# Patient Record
Sex: MALE | Race: WHITE | Employment: FULL TIME | URBAN - METROPOLITAN AREA
[De-identification: names, ages, dates, MRNs, and addresses within clinical notes are randomized per-mention and may not be internally consistent; named-entity substitution may affect disease eponyms.]

---

## 2017-09-13 ENCOUNTER — HOSPITAL ENCOUNTER (OUTPATIENT)
Dept: PHYSICAL THERAPY | Age: 37
Discharge: HOME | End: 2017-09-13
Payer: COMMERCIAL

## 2017-09-13 PROCEDURE — 97162 PT EVAL MOD COMPLEX 30 MIN: CPT

## 2017-09-13 PROCEDURE — 97110 THERAPEUTIC EXERCISES: CPT
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
## 2017-09-19 ENCOUNTER — HOSPITAL ENCOUNTER (OUTPATIENT)
Dept: PHYSICAL THERAPY | Age: 37
Discharge: HOME | End: 2017-09-19
Payer: COMMERCIAL

## 2017-09-19 PROCEDURE — 97110 THERAPEUTIC EXERCISES: CPT

## 2017-09-19 NOTE — PROGRESS NOTES
PHYSICAL THERAPY - DAILY TREATMENT NOTE    Patient Name: Mary Jane [place]        Date: 2017  : 1980   YES Patient  Verified  Visit #:   2     Insurance: Payor: [employer] / Plan: [address] / Product Type: [employer] /      In time: 4:30 Out time: 5:00   Total Treatment Time: 30     TREATMENT AREA = Lower back pain [M54.5]  Sciatica [M54.30]    SUBJECTIVE    Pain Level (on 0 to 10 scale):  6  / 10   Medication Changes/New allergies or changes in medical history, any new surgeries or procedures? NO    If yes, update Summary List   Subjective Functional Status/Changes:  []  No changes reported     \"Knees feel weak, tingling all up and down my thigh. The lower leg feels like it's been asleep all day, feeling like it's waking up. Top of the foot  Is a little numb. Haven't noticed anything on the L today. \"          OBJECTIVE    30 min Therapeutic Exercise:  [x]  See flow sheet   Rationale:      increase ROM, increase strength/stability, facilitate proper motor control. Throughout Rx min Patient Education:  YES   Reviewed HEP   []  Progressed/Changed HEP based on:   Display of proper form in clinic. Improvement in condition and complaints     Other Objective/Functional Measures:    Patient educated on purpose of centralization, neurophysiological effects, and biomechanical effects in relation to his condition. Patient educated on use of HEP as a method to further centralize and reduce symptoms independently outside of clinic and therapist intervention. He verbalized understanding that central symptoms are an improvement over peripheral symptoms. Patient has difficulty in attaining proper motor control and movement for therex due to compensatory movements.    Patient reports intent to have his PCP be established as his main referral   Post Treatment Pain Level (on 0 to 10) scale:    10     ASSESSMENT    Assessment/Changes in Function: Derangement directional preference not fully established     []  See Progress Note/Recertification   Patient will continue to benefit from skilled PT services to modify and progress therapeutic interventions, address functional mobility deficits, address ROM deficits, address strength deficits, analyze and address soft tissue restrictions, analyze and cue movement patterns and instruct in home and community integration  to attain remaining goals   Progress toward goals / Updated goals:    1st session since initial eval, no significant progress noted. PLAN    [x]  Upgrade activities as tolerated YES Continue plan of care   []  Discharge due to :    []  Other:      Therapist: Ryland Swan \"BJ\" Abrahan, DPT, Cert. MDT, Cert. DN, Cert.  SMT    Date: 9/19/2017 Time: 4:25 PM   Future Appointments  Date Time Provider Derick Tapia   9/19/2017 4:30 PM Daralene Baptise, [facility]   9/20/2017 4:30 PM [facility]   9/26/2017 5:00 PM Daralene Baptise, [facility]   9/28/2017 4:30 PM Daralene Baptise, [facility]   10/3/2017 4:30 PM Daralene Baptise, [facility]   10/5/2017 4:30 PM Daralene Baptise, [facility]   10/9/2017 4:30 PM Soto Claudia, [facility]   10/12/2017 4:30 PM Daralene Baptise, [facility]   10/16/2017 4:30 PM Soto Claudia, [facility]   10/19/2017 4:30 PM Daralene Baptise, [facility]   10/23/2017 4:30 PM Soto Claudia, [facility]

## 2017-09-20 ENCOUNTER — HOSPITAL ENCOUNTER (OUTPATIENT)
Dept: PHYSICAL THERAPY | Age: 37
Discharge: HOME | End: 2017-09-20
Payer: COMMERCIAL

## 2017-09-20 PROCEDURE — 97110 THERAPEUTIC EXERCISES: CPT

## 2017-09-20 PROCEDURE — 97014 ELECTRIC STIMULATION THERAPY: CPT

## 2017-09-20 PROCEDURE — 97530 THERAPEUTIC ACTIVITIES: CPT

## 2017-09-20 PROCEDURE — 97140 MANUAL THERAPY 1/> REGIONS: CPT

## 2017-09-20 NOTE — PROGRESS NOTES
PHYSICAL THERAPY - DAILY TREATMENT NOTE    Patient Name: Perla Clinton        Date: 2017  : 1980   yes Patient  Verified  Visit #:   3     Insurance: Payor: [id number] / Plan: Jony Locks / Product Type: Federal Funded Programs /      In time: 391 Out time: 530   Total Treatment Time: 55     TREATMENT AREA =  Lower back pain [M54.5]  Sciatica [M54.30]    SUBJECTIVE  Pain Level (on 0 to 10 scale):  6  / 10   Medication Changes/New allergies or changes in medical history, any new surgeries or procedures?    no  If yes, update Summary List   Subjective Functional Status/Changes:  []  No changes reported     Pt reports higher pain levels due to increase activity with significant other the night previous to appt  pt reports prone lying decreases L/s sxs  pt reports rides in car all day and rides in [place] terrain           OBJECTIVE  Modalities Rationale:     decrease inflammation and decrease pain to improve patient's ability to .perform ADLs/ bending/stooping/ lifting/prolong sitting, stding and amb/ stairs with ease   10 min [x] Estim, type/location: IFC to L/s with HOB elevated in prone with 2 pillows                                     []  att     [x]  unatt     []  w/US     [x]  w/ice    []  w/heat    min []  Mechanical Traction: type/lbs                   []  pro   []  sup   []  int   []  cont    []  before manual    []  after manual    min []  Ultrasound, settings/location:      min []  Iontophoresis w/ dexamethasone, location:                                               []  take home patch       []  in clinic    min []  Ice     []  Heat    location/position:     min []  Vasopneumatic Device, press/temp:     min []  Other:    [x] Skin assessment post-treatment (if applicable):    [x]  intact    []  redness- no adverse reaction     []redness - adverse reaction:        22 min Therapeutic Exercise:  [x]  See flow sheet   Rationale:      increase ROM and increase strength to improve the patients ability to perform ADLs/ bending/stooping/ lifting/prolong sitting, stding and amb/ stairs with ease        15 min Manual Therapy: STM/MFR to L/s darrell, and OP to L/s for ext mobs with HOB elevated and 2 pillow   Rationale:      decrease pain, increase ROM, increase tissue extensibility, decrease trigger points and increase postural awareness to improve patient's ability to perform ADLs/ bending/stooping/ lifting/prolong sitting, stding and amb/ stairs with ease       8 min Therapeutic Activity: postural/bed mobility training     Rationale:    increase ROM and increase strength to improve the patients ability to perform proper posture, bed mobility and transfers without p!           min Patient Education:  yes  Reviewed HEP   []  Progressed/Changed HEP based on:  Pt ed on importance and benefits of compliance with HEP, core strength/stability and proper posture; pt verbalized understanding     Other Objective/Functional Measures:    VCs + demo to perform proper technique for TE  AAKASH/HAILEY with HOB elevated-NE/NE  Rep RSG-P/B; centralized to L/s, NE with R LE  PTT in B L/s darrell       Post Treatment Pain Level (on 0 to 10) scale:   \"numb in the back\"  / 10     ASSESSMENT  Assessment/Changes in Function:     tingling in foot post manual, less numbness in R shin post rx  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 Progress toward goals / Updated goals:     · Short Term Goals: To be accomplished in  2  weeks:  1. Patient will demonstrate compliance with HEP. MET  2. Patient will demonstrate ability to centralize symptoms to level of L/S to facilitate independent management of symptoms. 3. Patient will report less than or equal to 6/10 pain at worst to allow increased activity tolerance. · Long Term Goals: To be accomplished in  4  weeks:  1. Patient will demonstrate independence with HEP. 2. Patient will report less than or equal to 4/10 pain at worst to allow increased activity tolerance. 3. Patient will score greater than or equal to 60/100, stage 4 (little difficulty performing usual work or household activities and hobbies), indicating increased function/decreased functional limitation.      PLAN  []  Upgrade activities as tolerated yes Continue plan of care   []  Discharge due to :    []  Other:      Therapist: Liliana Steinberg PTA    Date: 9/20/2017 Time: 3:11 PM     Future Appointments  Date Time Provider Derick Tapia   9/20/2017 4:30 PM [facility]   9/26/2017 5:00 PM Marino Sánchez, [facility]   9/28/2017 4:30 PM Marino Sánchez, [facility]   10/4/2017 4:00 PM [facility]   10/5/2017 4:30 PM Marino Sánchez, [facility]   10/9/2017 4:30 PM Destiny Vazquez, [facility]   10/12/2017 4:30 PM Marino Sánchez, [facility]   10/16/2017 4:30 PM Destiny Vazquez, [facility]   10/19/2017 4:30 PM Marino Sánchez, [facility]   10/23/2017 4:30 PM Destiny Vazquez, [facility]

## 2017-09-26 ENCOUNTER — HOSPITAL ENCOUNTER (OUTPATIENT)
Dept: PHYSICAL THERAPY | Age: 37
Discharge: HOME | End: 2017-09-26
Payer: COMMERCIAL

## 2017-09-26 PROCEDURE — 97140 MANUAL THERAPY 1/> REGIONS: CPT

## 2017-09-26 PROCEDURE — 97014 ELECTRIC STIMULATION THERAPY: CPT

## 2017-09-26 PROCEDURE — 97110 THERAPEUTIC EXERCISES: CPT

## 2017-09-26 NOTE — PROGRESS NOTES
PHYSICAL THERAPY - DAILY TREATMENT NOTE    Patient Name: Aranza Thayer        Date: 2017  : 1980   YES Patient  Verified  Visit #:   4     Insurance: Payor: [employer] / Plan: Isabel Srivastava / Product Type: Federal Funded Programs /      In time: 5:00 Out time: 5:55   Total Treatment Time: 55     TREATMENT AREA = Lower back pain [M54.5]  Sciatica [M54.30]    SUBJECTIVE    Pain Level (on 0 to 10 scale):  5  / 10   Medication Changes/New allergies or changes in medical history, any new surgeries or procedures? NO    If yes, update Summary List   Subjective Functional Status/Changes:  []  No changes reported     \"Top of foot and calf are still asleep. \"          OBJECTIVE    15 min Therapeutic Exercise:  [x]  See flow sheet   Rationale:      increase ROM, increase strength/stability, facilitate proper motor control. 25 min Manual Therapy: REIL with OP;  Extension mobilization in prone   Rationale:      decrease pain, increase ROM, increase tissue extensibility, decrease trigger points and facilitate proper motor control   Modalities Rationale: Prophylaxis/Palliative   15 min [] Estim, type/location: IFC, prone, L/S                                    []  att     []  unatt     []  w/US     []  w/ice    []  W/heat    []  before manual    []  after manual    min []  Mechanical Traction: type/lbs                   []  pro   []  sup   []  int   []  cont    []  before manual    []  after manual    min []  Ultrasound, settings/location:      min []  Iontophoresis w/ dexamethasone, location:                                               []  take home patch       []  in clinic    min []  Ice     []  Heat    Position/location:     min []  Vasopneumatic Device, press/temp:    [] Skin assessment post-treatment (if applicable):    []  intact    []  redness- no adverse reaction     []redness - adverse reaction:      Throughout Rx min Patient Education:  YES   Reviewed HEP []  Progressed/Changed HEP based on:   Display of proper form in clinic. Improvement in condition and complaints     Other Objective/Functional Measures:    TA draw and REIL creates sensation to L L/S during, NBNW  Manual changes foot/calf numbness to tingling during, but NBNW afterwards           Post Treatment Pain Level (on 0 to 10) scale:   5  / 10     ASSESSMENT    Assessment/Changes in Function:     Unable to differentiate whether or not derangement is reducible at this time. []  See Progress Note/Recertification   Patient will continue to benefit from skilled PT services to modify and progress therapeutic interventions, address functional mobility deficits, address ROM deficits, address strength deficits, analyze and address soft tissue restrictions, analyze and cue movement patterns, analyze and modify body mechanics/ergonomics and instruct in home and community integration  to attain remaining goals   Progress toward goals / Updated goals:    No significant progress made in centralization of symptoms. Maintaining HEP compliance     PLAN    [x]  Upgrade activities as tolerated YES Continue plan of care   []  Discharge due to :    []  Other:      Therapist: Fadumo Walker \"SHAYNE\" SHAN Gauthier, Cert. MDT, Cert. DN, Cert.  SMT    Date: 9/26/2017 Time: 5:00 PM   Future Appointments  Date Time Provider Derick Tapai   9/28/2017 4:30 PM Emry Steve, [facility]   10/4/2017 4:00 PM Radha [facility]   10/5/2017 4:30 PM Emry Steve, PT [facility]   10/9/2017 4:30 PM Femi Gambino, PT [facility]   10/12/2017 4:30 PM Emry Steve, PT [facility]   10/16/2017 4:30 PM Femi Goad, PT [facility]   10/19/2017 4:30 PM Emry Steve, PT [facility]   10/23/2017 4:30 PM Femi Goad, [facility]

## 2017-09-28 ENCOUNTER — HOSPITAL ENCOUNTER (OUTPATIENT)
Dept: PHYSICAL THERAPY | Age: 37
Discharge: HOME | End: 2017-09-28
Payer: COMMERCIAL

## 2017-09-28 PROCEDURE — 97110 THERAPEUTIC EXERCISES: CPT

## 2017-09-28 PROCEDURE — 97014 ELECTRIC STIMULATION THERAPY: CPT

## 2017-09-28 PROCEDURE — 97140 MANUAL THERAPY 1/> REGIONS: CPT

## 2017-09-28 NOTE — PROGRESS NOTES
PHYSICAL THERAPY - DAILY TREATMENT NOTE    Patient Name: Ruben Worrell        Date: 2017  : 1980   YES Patient  Verified  Visit #:   5     Insurance: Payor: [id number] / Plan: Misty Tariq / Product Type: [employer] /      In time: 4:15early Out time: 5:15   Total Treatment Time: 60     TREATMENT AREA = Lower back pain [M54.5]  Sciatica [M54.30]    SUBJECTIVE    Pain Level (on 0 to 10 scale):  6  / 10   Medication Changes/New allergies or changes in medical history, any new surgeries or procedures? NO    If yes, update Summary List   Subjective Functional Status/Changes:  []  No changes reported     \"I can feel the bottom of my foot fine, but everything else is numb. Feels like the top of my foot is wax. I still haven't heard anything from my doctor. \"          OBJECTIVE    30 min Therapeutic Exercise:  [x]  See flow sheet   Rationale:      increase ROM, increase strength/stability, facilitate proper motor control. 15 min Manual Therapy: REIL with OP;  Extension mobilization in prone  Extension moblization with HOC L  Extension mobilization in [place] position   Rationale:      decrease pain, increase ROM, increase tissue extensibility, decrease trigger points and facilitate proper motor control   Modalities Rationale: Prophylaxis/Palliative   15 min [x] Estim, type/location: IFC, [place] position                                    []  att     [x]  unatt     []  w/US     []  w/ice    [x]  W/heat    []  before manual    []  after manual    min []  Mechanical Traction: type/lbs                   []  pro   []  sup   []  int   []  cont    []  before manual    []  after manual    min []  Ultrasound, settings/location:      min []  Iontophoresis w/ dexamethasone, location:                                               []  take home patch       []  in clinic    min []  Ice     []  Heat    Position/location:     min []  Vasopneumatic Device, press/temp:    [] Skin assessment post-treatment (if applicable):    []  intact    []  redness- no adverse reaction     []redness - adverse reaction:      Throughout Rx min Patient Education:  YES   Reviewed HEP   []  Progressed/Changed HEP based on:   Display of proper form in clinic. Improvement in condition and complaints     Other Objective/Functional Measures:    Trial of [place] position \"feels good\" but no significant change in reduction of numbness. Post Treatment Pain Level (on 0 to 10) scale:   0  / 10     ASSESSMENT    Assessment/Changes in Function:     Lateral component     []  See Progress Note/Recertification   Patient will continue to benefit from skilled PT services to modify and progress therapeutic interventions, address functional mobility deficits, address ROM deficits, address strength deficits, analyze and address soft tissue restrictions, analyze and cue movement patterns, analyze and modify body mechanics/ergonomics and instruct in home and community integration  to attain remaining goals   Progress toward goals / Updated goals:    Slow progress in centralization     PLAN    [x]  Upgrade activities as tolerated YES Continue plan of care   []  Discharge due to :    []  Other:      Therapist: Marli Trujillo \"BJ\" Arturo Conteh, SHAN, Cert. MDT, Cert. DN, Cert.  SMT    Date: 9/28/2017 Time: 4:39 PM   Future Appointments  Date Time Provider Derick Tapia   10/4/2017 4:00 PM Suha [facility]   10/5/2017 4:30 PM [facility], [facility]   10/9/2017 4:30 PM Tien Blum PT [facility]   10/12/2017 4:30 PM [facility], [facility]   10/16/2017 4:30 PM Tien Blum PT [facility]   10/19/2017 4:30 PM [facility], [facility]   10/23/2017 4:30 PM Tien Blum, [facility]

## 2017-10-03 ENCOUNTER — APPOINTMENT (OUTPATIENT)
Dept: PHYSICAL THERAPY | Age: 37
End: 2017-10-03
Payer: OTHER GOVERNMENT

## 2017-10-04 ENCOUNTER — HOSPITAL ENCOUNTER (OUTPATIENT)
Dept: PHYSICAL THERAPY | Age: 37
Discharge: HOME | End: 2017-10-04
Payer: OTHER GOVERNMENT

## 2017-10-04 PROCEDURE — 97140 MANUAL THERAPY 1/> REGIONS: CPT

## 2017-10-04 PROCEDURE — 97110 THERAPEUTIC EXERCISES: CPT

## 2017-10-04 PROCEDURE — 97014 ELECTRIC STIMULATION THERAPY: CPT

## 2017-10-04 NOTE — PROGRESS NOTES
PHYSICAL THERAPY - DAILY TREATMENT NOTE    Patient Name: Ruben Worrell        Date: 10/4/2017  : 1980   yes Patient  Verified  Visit #:   6     Insurance: Payor: [id number] / Plan: Misty Tariq / Product Type: [employer] /      In time: 400 Out time: 500   Total Treatment Time: 60     TREATMENT AREA =  Lower back pain [M54.5]  Sciatica [M54.30]    SUBJECTIVE  Pain Level (on 0 to 10 scale):  2-3  / 10   Medication Changes/New allergies or changes in medical history, any new surgeries or procedures?    no  If yes, update Summary List   Subjective Functional Status/Changes:  []  No changes reported     Pt reports overall improvement in mobility and in amb        OBJECTIVE  Modalities Rationale:     decrease inflammation and decrease pain to improve patient's ability to .perform ADLs/ bending/stooping/ lifting/prolong sitting, stding and amb/ stairs with ease   15 min [x] Estim, type/location: IFC to L/s with HOB elevated in prone                                     []  att     [x]  unatt     []  w/US     [x]  w/ice    []  w/heat    min []  Mechanical Traction: type/lbs                   []  pro   []  sup   []  int   []  cont    []  before manual    []  after manual    min []  Ultrasound, settings/location:      min []  Iontophoresis w/ dexamethasone, location:                                               []  take home patch       []  in clinic    min []  Ice     []  Heat    location/position:     min []  Vasopneumatic Device, press/temp:     min []  Other:    [x] Skin assessment post-treatment (if applicable):    [x]  intact    []  redness- no adverse reaction     []redness - adverse reaction:        30 min Therapeutic Exercise:  [x]  See flow sheet   Rationale:      increase ROM and increase strength to improve the patients ability to perform ADLs/ bending/stooping/ lifting/prolong sitting, stding and amb/ stairs with ease        15 min Manual Therapy: IASTM with large cup edge to L/s darrell, and OP to L/s HOC L for ext mobs*note  HOB elevated    Rationale:      decrease pain, increase ROM, increase tissue extensibility, decrease trigger points and increase postural awareness to improve patient's ability to perform ADLs/ bending/stooping/ lifting/prolong sitting, stding and amb/ stairs with ease        min Patient Education:  yes  Reviewed HEP   []  Progressed/Changed HEP based on:  Pt ed on importance and benefits of compliance with HEP, core strength/stability and proper posture; pt verbalized understanding     Other Objective/Functional Measures:    VCs + demo to perform proper technique for TE  demos log roll (I) without increased p!  c/o L L/s spasm with HL hip abd, decreased with manual     initiated prone TKE/quad str without c/o p! Post Treatment Pain Level (on 0 to 10) scale:   \"numb\"  / 10     ASSESSMENT  Assessment/Changes in Function:     Progressed there-ex without c/o increase p!  demos improved posture without VCs     []  See Progress Note/Recertification   Patient will continue to benefit from skilled PT services to modify and progress therapeutic interventions, address functional mobility deficits, address ROM deficits, address strength deficits, analyze and address soft tissue restrictions, analyze and cue movement patterns, analyze and modify body mechanics/ergonomics, assess and modify postural abnormalities and instruct in home and community integration to attain remaining goals. Progress toward goals / Updated goals: · Short Term Goals: To be accomplished in  2  weeks:  1. Patient will demonstrate compliance with HEP. MET  2. Patient will demonstrate ability to centralize symptoms to level of L/S to facilitate independent management of symptoms. 3. Patient will report less than or equal to 6/10 pain at worst to allow increased activity tolerance. max p! reported since 9/26/17 is 6/10  · Long Term Goals:  To be accomplished in 4  weeks:  1. Patient will demonstrate independence with HEP. 2. Patient will report less than or equal to 4/10 pain at worst to allow increased activity tolerance. 3. Patient will score greater than or equal to 60/100, stage 4 (little difficulty performing usual work or household activities and hobbies), indicating increased function/decreased functional limitation.      PLAN  []  Upgrade activities as tolerated yes Continue plan of care   []  Discharge due to :    []  Other:      Therapist: Nilson Linares PTA    Date: 10/4/2017 Time: 3:11 PM     Future Appointments  Date Time Provider Derick Tapia   10/5/2017 4:30 PM Delanna Amy, [address] [place] [address]   10/9/2017 4:30 PM Cindy Garcia [facility]   10/12/2017 4:30 PM Delanna Landing, [address] [place] [address]   10/16/2017 4:30 PM Cindy Garcia PT [facility]   10/19/2017 4:30 PM Darien Reyes PT [facility]   10/23/2017 4:30 PM Cindy Garcia PT [facility]

## 2017-10-05 ENCOUNTER — HOSPITAL ENCOUNTER (OUTPATIENT)
Dept: PHYSICAL THERAPY | Age: 37
Discharge: HOME | End: 2017-10-05
Payer: OTHER GOVERNMENT

## 2017-10-05 PROCEDURE — 97110 THERAPEUTIC EXERCISES: CPT

## 2017-10-05 PROCEDURE — 97140 MANUAL THERAPY 1/> REGIONS: CPT

## 2017-10-05 NOTE — PROGRESS NOTES
PHYSICAL THERAPY - DAILY TREATMENT NOTE    Patient Name: Glenn Smith        Date: 10/5/2017  : 1980   YES Patient  Verified  Visit #:     Insurance: Payor: [id number] / Plan: Truong Deleon / Product Type: [employer] /      In time: 4:30 Out time: 5:20   Total Treatment Time: 50     TREATMENT AREA = Lower back pain [M54.5]  Sciatica [M54.30]    SUBJECTIVE    Pain Level (on 0 to 10 scale):  3  / 10   Medication Changes/New allergies or changes in medical history, any new surgeries or procedures? NO    If yes, update Summary List   Subjective Functional Status/Changes:  []  No changes reported     \"My back hasn't been hurting as much since the weekend. \"          OBJECTIVE    40 min Therapeutic Exercise:  [x]  See flow sheet   Rationale:      increase ROM, increase strength/stability, facilitate proper motor control. 10 min Manual Therapy: Flexion rotation mobilization to R (UE to L)   Rationale:      decrease pain, increase ROM, increase tissue extensibility, decrease trigger points and facilitate proper motor control       Throughout Rx min Patient Education:  YES   Reviewed HEP   []  Progressed/Changed HEP based on:   Display of proper form in clinic. Improvement in condition and complaints     Other Objective/Functional Measures:    Cavitation at end range of manual per above.   Required pillow under R hip to perform hook-lying therex  Educated pt on performing RSG R in free standing      Post Treatment Pain Level (on 0 to 10) scale:    10     ASSESSMENT    Assessment/Changes in Function:     Appears to have relevant R lateral component as Flexion Rotation Mobilization alleviated symptoms significantly     []  See Progress Note/Recertification   Patient will continue to benefit from skilled PT services to modify and progress therapeutic interventions, address functional mobility deficits, address strength deficits, analyze and address soft tissue restrictions, analyze and cue movement patterns, analyze and modify body mechanics/ergonomics and instruct in home and community integration  to attain remaining goals   Progress toward goals / Updated goals:    Pain appears to be reducing, though peripheral symptoms not improving significantly     PLAN    [x]  Upgrade activities as tolerated YES Continue plan of care   []  Discharge due to :    []  Other:      Therapist: Jennifer Lu \"BJ\" SHAN Gauthier, Cert. MDT, Cert. DN, Cert.  SMT    Date: 10/5/2017 Time: 5:19 PM   Future Appointments  Date Time Provider Derick Tapia   10/9/2017 4:30 PM Summer Islas, [facility]   10/12/2017 4:30 PM Louise , [facility]   10/16/2017 4:30 PM Summer Islas, [facility]   10/19/2017 4:30 PM Louise , [facility]   10/23/2017 4:30 PM Summer Islas, [facility]

## 2017-10-09 ENCOUNTER — HOSPITAL ENCOUNTER (OUTPATIENT)
Dept: PHYSICAL THERAPY | Age: 37
Discharge: HOME | End: 2017-10-09
Payer: OTHER GOVERNMENT

## 2017-10-09 PROCEDURE — 97014 ELECTRIC STIMULATION THERAPY: CPT

## 2017-10-09 PROCEDURE — 97110 THERAPEUTIC EXERCISES: CPT

## 2017-10-09 NOTE — PROGRESS NOTES
PHYSICAL THERAPY - DAILY TREATMENT NOTE    Patient Name: Bethany Garcia        Date: 10/9/2017  : 1980   YES Patient  Verified  Visit #:  [id number]     Insurance: Payor: [id number] / Plan: Miguel A Camara / Product Type: Federal Funded Programs /      In time: 4:30 Out time: 5:40   Total Treatment Time: 70     Medicare Time Tracking (below)   Total Timed Codes (min):  NA 1:1 Treatment Time:  NA     TREATMENT AREA =  L/S    SUBJECTIVE    Pain Level (on 0 to 10 scale):  8  / 10   Medication Changes/New allergies or changes in medical history, any new surgeries or procedures? NO    If yes, update Summary List   Subjective Functional Status/Changes:  []  No changes reported     \"I asked them to up my nerve pain but I don't have a primary so I don't know who to ask. Overall the pain is better but my legs haven't changed. I think pistoning and shifting sometimes.            OBJECTIVE    Modalities Rationale:  Decrease pain  15 min [x] Estim, type/location: Prone prop IFC to lower L/S                                     []  att     [x]  unatt     []  w/US     [x]  w/ice    []  w/heat    min []  Mechanical Traction: type/lbs                   []  pro   []  sup   []  int   []  cont    []  before manual    []  after manual    min []  Ultrasound, settings/location:      min []  Iontophoresis w/ dexamethasone, location:                                               []  take home patch       []  in clinic    min []  Ice     []  Heat    location/position:     min []  Vasopneumatic Device, press/temp:     min []  Other:    [] Skin assessment post-treatment (if applicable):    []  intact    []  redness- no adverse reaction     []redness - adverse reaction:      55  (40) min Therapeutic Exercise:  [x]  See flow sheet   Rationale:      increase ROM, increase strength and stability to improve the patients ability to perform ADLs with increased ease      min Patient Education:  Kole Fernandes Reviewed HEP   []  Progressed/Changed HEP based on:   Cont HEP     Other Objective/Functional Measures:Trialed L/S roll on NS, pt reports increased soreness from feeling pressure from roll   HAILEY decreased to 1/10  No increased pain with prone TA draw, reports increased vertical sensation of pain with added glute squeeze but not TKE      Post Treatment Pain Level (on 0 to 10) scale:   0  / 10 \"numb\"     ASSESSMENT    Assessment/Changes in Function:     Reassess NV for MD note   []  See Progress Note/Recertification   Patient will continue to benefit from skilled PT services to analyze,, cue,, progress,, modify,, demonstrate,, instruct, and address, movement patterns,, therapeutic interventions,, postural abnormalities,, soft tissue restrictions,, ROM,, strength,, functional mobility,, body mechanics/ergonomics, and home and community integration, to attain remaining goals.    Progress toward goals / Updated goals:    Reassess NV for MD note     PLAN    []  Upgrade activities as tolerated YES Continue plan of care   []  Discharge due to :    []  Other:      Therapist: Yogesh Lmi DPT    Date: 10/9/2017 Time: 5:09 PM   Future Appointments  Date Time Provider Derick Tapia   10/12/2017 4:30 PM Olayinka Varghese PT [facility]   10/16/2017 4:30 PM Jane Griffith PT [facility]   10/19/2017 4:30 PM Olayinka Varghese PT [facility]   10/23/2017 4:30 PM Jane Griffith PT [facility]

## 2017-10-12 ENCOUNTER — HOSPITAL ENCOUNTER (OUTPATIENT)
Dept: PHYSICAL THERAPY | Age: 37
Discharge: HOME | End: 2017-10-12
Payer: OTHER GOVERNMENT

## 2017-10-12 PROCEDURE — 97014 ELECTRIC STIMULATION THERAPY: CPT

## 2017-10-12 PROCEDURE — 97140 MANUAL THERAPY 1/> REGIONS: CPT

## 2017-10-12 PROCEDURE — 97110 THERAPEUTIC EXERCISES: CPT

## 2017-10-12 NOTE — PROGRESS NOTES
PHYSICAL THERAPY - DAILY TREATMENT NOTE    Patient Name: Glenn Smith        Date: 10/12/2017  : 1980   YES Patient  Verified  Visit #:     Insurance: Payor: [id number] / Plan: Truong Deleon / Product Type: [employer] /      In time: 4:30 Out time: 5:20   Total Treatment Time: 50     TREATMENT AREA = Lower back pain [M54.5]  Sciatica [M54.30]    SUBJECTIVE    Pain Level (on 0 to 10 scale):  6  / 10   Medication Changes/New allergies or changes in medical history, any new surgeries or procedures? NO    If yes, update Summary List   Subjective Functional Status/Changes:  []  No changes reported     \"past few days I've been feeling like my L hip is loose so I'm limping a lot more. I have more pain on the L side and my (L trunk) region is more tight. Also getting more numbness to the mid/upper back. The pain is more like a \"u\" shape and I feel like I've got a lump there. I can't think of anything I've done different the past few days. \"   \"They finally set me up with an appointment for me to f/u with a doctor on the . \"         OBJECTIVE    10 min Therapeutic Exercise:  [x]  See flow sheet   Rationale:      increase ROM, increase strength/stability, facilitate proper motor control. 25 min Manual Therapy: STM to L/S, re-assessment via MDT.    Rationale:      decrease pain, increase ROM, increase tissue extensibility, decrease trigger points and facilitate proper motor control   Modalities Rationale: Prophylaxis/Palliative   15 min [] Estim, type/location: Prone, L/S                                    []  att     []  unatt     []  w/US     []  w/ice    []  W/heat    []  before manual    []  after manual    min []  Mechanical Traction: type/lbs                   []  pro   []  sup   []  int   []  cont    []  before manual    []  after manual    min []  Ultrasound, settings/location:      min []  Iontophoresis w/ dexamethasone, location: []  take home patch       []  in clinic    min []  Ice     []  Heat    Position/location:     min []  Vasopneumatic Device, press/temp:    [] Skin assessment post-treatment (if applicable):    []  intact    []  redness- no adverse reaction     []redness - adverse reaction:      Throughout Rx min Patient Education:  YES   Reviewed HEP   []  Progressed/Changed HEP based on:   Display of proper form in clinic. Improvement in condition and complaints     Other Objective/Functional Measures:    See PN     Post Treatment Pain Level (on 0 to 10) scale:   6 / 10     ASSESSMENT    Assessment/Changes in Function:     See PN     []  See Progress Note/Recertification   Patient will continue to benefit from skilled PT services to n/a  to attain remaining goals   Progress toward goals / Updated goals:    See Note     PLAN    [x]  Upgrade activities as tolerated YES Continue plan of care   []  Discharge due to :    []  Other:      Therapist: Minerva Phelan \"BJ\" SHAN Gauthier, Cert. MDT, Cert. DN, Cert.  SMT    Date: 10/12/2017 Time: 4:42 PM   Future Appointments  Date Time Provider Derick Tapia   10/16/2017 4:30 PM Katty Chew PT [facility]   10/19/2017 4:30 PM Tawanna Girard PT [facility]   10/20/2017 3:00 PM Waqas Vargas NP [facility]   10/23/2017 4:30 PM Katty Chew PT [facility]

## 2017-10-12 NOTE — PROGRESS NOTES
[facility]ERAP[facility]  [address] [place] [address] Katherine Rasmussen, [address] - Phone: [phone]  Fax: [phone]  PROGRESS NOTE  Patient Name: Marie Schmitz : 1980   Treatment/Medical Diagnosis: Lower back pain [M54.5]  Sciatica [M54.30]   Referral Source: Kary Mccall MD     Date of Initial Visit: 17 Attended Visits: 9 Missed Visits: 0     SUMMARY OF TREATMENT  Patient's POC has consisted of therex, therapeutic activities, manual therapy prn, modalities prn, pt. education, and a comprehensive HEP. Treatment strategies used to address functional mobility deficits, ROM deficits, strength deficits, analyze and address soft tissue restrictions, analyze and cue movement patterns, analyze and modify body mechanics/ergonomics, assess and modify postural abnormalities and instruct in home and community integration. CURRENT STATUS  Patient has made minimal progress in maintaining centralization of RLE paraesthesia. He is able to abolish pain but numbness persists, and he has minimal carryover between treatment sessions. Lateral component noted over the past 3 sessions which improved pain overall, but upon today's treatment session he reported L sided symptoms of hip instability and radiating pain along L thoracolumbar spine. He attributes symptoms to compensation with loaded activities, but otherwise no change in activities since last treatment session on 10/9/17. Patient was re-assessed for directional preference and symptom change. No directional preference noted in ability to alleviate extremity symptoms though LBP decreased with extension. Patient denies any worsening of bowel/bladder control, no saddle paraesthesia, no loss in motor control when BLE was re-assessed. General deconditioning present but no tonic changes. Reflexes intact. Goal/Measure of Progress Goal Met? · Short Term Goals: To be accomplished in  2  weeks:  1.  Patient will demonstrate compliance with HEP. 2. Patient will demonstrate ability to centralize symptoms to level of L/S to facilitate independent management of symptoms. 3. Patient will report less than or equal to 6/10 pain at worst to allow increased activity tolerance. · Long Term Goals: To be accomplished in  4  weeks:  1. Patient will demonstrate independence with HEP. 2. Patient will report less than or equal to 4/10 pain at worst to allow increased activity tolerance. 3. Patient will score greater than or equal to 60/100, stage 4 (little difficulty performing usual work or household activities and hobbies), indicating increased function/decreased functional limitation. MET    NO        Partially Met          Ongoing    Not Met      Not met   RECOMMENDATIONS  Pt to be placed on hold until f/u with MD. Defer re: continuation of care or DC at this time due to changes in symptoms. Before change in symptoms, derangement did not appear to be reducible. If you have any questions/comments please contact us directly at [phone]. Thank you for allowing us to assist in the care of your patient. Therapist Signature: Ace Brule \"BJ\" Delmi Olivares DPT, Cert. MDT, Cert. DN, Cert. SMT Date: 84/56/9758   Certification Period: n/a     Reporting Period n/a   Time: 5:22 PM   NOTE TO PHYSICIAN:  PLEASE COMPLETE THE ORDERS BELOW AND FAX TO   [facility]: [phone]. If you are unable to process this request in 24 hours please contact our office: [phone].    ___ I have read the above report and request that my patient continue as recommended.   ___ I have read the above report and request that my patient continue therapy with the following changes/special instructions:_________________________________________________________   ___ I have read the above report and request that my patient be discharged from therapy.      Physician Signature:        Date:       Time:

## 2017-10-16 ENCOUNTER — APPOINTMENT (OUTPATIENT)
Dept: PHYSICAL THERAPY | Age: 37
End: 2017-10-16
Payer: OTHER GOVERNMENT

## 2017-10-16 ENCOUNTER — HOSPITAL ENCOUNTER (EMERGENCY)
Age: 37
Discharge: HOME | End: 2017-10-16
Attending: EMERGENCY MEDICINE
Payer: OTHER GOVERNMENT

## 2017-10-16 VITALS
WEIGHT: 230 LBS | SYSTOLIC BLOOD PRESSURE: 133 MMHG | BODY MASS INDEX: 36.96 KG/M2 | HEART RATE: 100 BPM | RESPIRATION RATE: 16 BRPM | OXYGEN SATURATION: 97 % | TEMPERATURE: 98.4 F | DIASTOLIC BLOOD PRESSURE: 94 MMHG | HEIGHT: 66 IN

## 2017-10-16 DIAGNOSIS — M54.32 SCIATICA OF LEFT SIDE: Primary | ICD-10-CM

## 2017-10-16 PROCEDURE — 99282 EMERGENCY DEPT VISIT SF MDM: CPT

## 2017-10-16 RX ORDER — NAPROXEN 500 MG/1
500 TABLET ORAL 2 TIMES DAILY WITH MEALS
COMMUNITY
End: 2017-10-19 | Stop reason: ALTCHOICE

## 2017-10-16 RX ORDER — GABAPENTIN 300 MG/1
300 CAPSULE ORAL 3 TIMES DAILY
COMMUNITY
End: 2017-11-21

## 2017-10-16 RX ORDER — PREDNISONE 50 MG/1
50 TABLET ORAL DAILY
Qty: 5 TAB | Refills: 0 | Status: SHIPPED | OUTPATIENT
Start: 2017-10-16 | End: 2017-10-21

## 2017-10-16 RX ORDER — OMEPRAZOLE 20 MG/1
20 CAPSULE, DELAYED RELEASE ORAL DAILY
COMMUNITY
End: 2017-11-21 | Stop reason: ALTCHOICE

## 2017-10-16 RX ORDER — HYDROCODONE BITARTRATE AND ACETAMINOPHEN 5; 325 MG/1; MG/1
TABLET ORAL
Qty: 12 TAB | Refills: 0 | Status: SHIPPED | OUTPATIENT
Start: 2017-10-16 | End: 2018-01-18 | Stop reason: ALTCHOICE

## 2017-10-16 NOTE — ED NOTES
Patient states he has been doubling up on gabapentin this week due to the pain, states he cnnot get into the [address]
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
## 2017-10-16 NOTE — ED TRIAGE NOTES
Lower L back pain and pain in L hip, onset one week, denies recent injury/trauma to back, states \"it feels like there is a mass in my L hip that is pushing on my hip and causing it to feel out of place\"    Abdominal \"swelling\" and pain, aome nausea \"like sea sickness\"

## 2017-10-16 NOTE — DISCHARGE INSTRUCTIONS
Sciatica: Exercises  Your Care Instructions  Here are some examples of typical rehabilitation exercises for your condition. Start each exercise slowly. Ease off the exercise if you start to have pain. Your doctor or physical therapist will tell you when you can start these exercises and which ones will work best for you. When you are not being active, find a comfortable position for rest. Some people are comfortable on the floor or a medium-firm bed with a small pillow under their head and another under their knees. Some people prefer to lie on their side with a pillow between their knees. Don't stay in one position for too long. Take short walks (10 to 20 minutes) every 2 to 3 hours. Avoid slopes, hills, and stairs until you feel better. Walk only distances you can manage without pain, especially leg pain. How to do the exercises  Back stretches    1. Get down on your hands and knees on the floor. 2. Relax your head and allow it to droop. Round your back up toward the ceiling until you feel a nice stretch in your upper, middle, and lower back. Hold this stretch for as long as it feels comfortable, or about 15 to 30 seconds. 3. Return to the starting position with a flat back while you are on your hands and knees. 4. Let your back sway by pressing your stomach toward the floor. Lift your buttocks toward the ceiling. 5. Hold this position for 15 to 30 seconds. 6. Repeat 2 to 4 times. Follow-up care is a key part of your treatment and safety. Be sure to make and go to all appointments, and call your doctor if you are having problems. It's also a good idea to know your test results and keep a list of the medicines you take. Where can you learn more? Go to http://anoop-adriana.info/. Enter U614 in the search box to learn more about \"Sciatica: Exercises. \"  Current as of: March 21, 2017  Content Version: 11.3  © 3570-0186 [employer].  Care instructions adapted under license by [address] (which disclaims liability or warranty for this information). If you have questions about a medical condition or this instruction, always ask your healthcare professional. [address] any warranty or liability for your use of this information.

## 2017-10-19 ENCOUNTER — HOSPITAL ENCOUNTER (OUTPATIENT)
Dept: LAB | Age: 37
Discharge: HOME | End: 2017-10-19
Payer: COMMERCIAL

## 2017-10-19 ENCOUNTER — OFFICE VISIT (OUTPATIENT)
Dept: FAMILY MEDICINE CLINIC | Facility: CLINIC | Age: 37
End: 2017-10-19

## 2017-10-19 ENCOUNTER — APPOINTMENT (OUTPATIENT)
Dept: PHYSICAL THERAPY | Age: 37
End: 2017-10-19
Payer: OTHER GOVERNMENT

## 2017-10-19 VITALS
HEART RATE: 105 BPM | RESPIRATION RATE: 17 BRPM | WEIGHT: 250 LBS | OXYGEN SATURATION: 93 % | BODY MASS INDEX: 40.18 KG/M2 | HEIGHT: 66 IN | TEMPERATURE: 98.1 F | SYSTOLIC BLOOD PRESSURE: 142 MMHG | DIASTOLIC BLOOD PRESSURE: 84 MMHG

## 2017-10-19 DIAGNOSIS — Z00.00 ENCOUNTER FOR MEDICAL EXAMINATION TO ESTABLISH CARE: ICD-10-CM

## 2017-10-19 DIAGNOSIS — M54.32 SCIATICA OF LEFT SIDE: ICD-10-CM

## 2017-10-19 DIAGNOSIS — Z23 ENCOUNTER FOR IMMUNIZATION: Primary | ICD-10-CM

## 2017-10-19 LAB
25(OH)D3 SERPL-MCNC: 19.2 NG/ML (ref 30–100)
ALBUMIN SERPL-MCNC: 3.8 G/DL (ref 3.4–5)
ALBUMIN/GLOB SERPL: 1.4 {RATIO} (ref 0.8–1.7)
ALP SERPL-CCNC: 89 U/L (ref 45–117)
ALT SERPL-CCNC: 64 U/L (ref 16–61)
ANION GAP SERPL CALC-SCNC: 6 MMOL/L (ref 3–18)
APPEARANCE UR: CLEAR
AST SERPL-CCNC: 25 U/L (ref 15–37)
BACTERIA URNS QL MICRO: NEGATIVE /HPF
BASOPHILS # BLD: 0 K/UL (ref 0–0.06)
BASOPHILS NFR BLD: 0 % (ref 0–2)
BILIRUB SERPL-MCNC: 0.4 MG/DL (ref 0.2–1)
BILIRUB UR QL: NEGATIVE
BUN SERPL-MCNC: 24 MG/DL (ref 7–18)
BUN/CREAT SERPL: 20 (ref 12–20)
CALCIUM SERPL-MCNC: 8.7 MG/DL (ref 8.5–10.1)
CAOX CRY URNS QL MICRO: ABNORMAL
CHLORIDE SERPL-SCNC: 108 MMOL/L (ref 100–108)
CHOLEST SERPL-MCNC: 216 MG/DL
CO2 SERPL-SCNC: 28 MMOL/L (ref 21–32)
COLOR UR: ABNORMAL
CREAT SERPL-MCNC: 1.2 MG/DL (ref 0.6–1.3)
DIFFERENTIAL METHOD BLD: ABNORMAL
EOSINOPHIL # BLD: 0.1 K/UL (ref 0–0.4)
EOSINOPHIL NFR BLD: 1 % (ref 0–5)
EPITH CASTS URNS QL MICRO: ABNORMAL /LPF (ref 0–5)
ERYTHROCYTE [DISTWIDTH] IN BLOOD BY AUTOMATED COUNT: 13.2 % (ref 11.6–14.5)
EST. AVERAGE GLUCOSE BLD GHB EST-MCNC: 111 MG/DL
GLOBULIN SER CALC-MCNC: 2.7 G/DL (ref 2–4)
GLUCOSE SERPL-MCNC: 90 MG/DL (ref 74–99)
GLUCOSE UR STRIP.AUTO-MCNC: NEGATIVE MG/DL
HBA1C MFR BLD: 5.5 % (ref 4.2–5.6)
HCT VFR BLD AUTO: 43.4 % (ref 36–48)
HDLC SERPL-MCNC: 37 MG/DL (ref 40–60)
HDLC SERPL: 5.8 {RATIO} (ref 0–5)
HGB BLD-MCNC: 14.6 G/DL (ref 13–16)
HGB UR QL STRIP: NEGATIVE
KETONES UR QL STRIP.AUTO: NEGATIVE MG/DL
LDLC SERPL CALC-MCNC: 111 MG/DL (ref 0–100)
LEUKOCYTE ESTERASE UR QL STRIP.AUTO: NEGATIVE
LIPID PROFILE,FLP: ABNORMAL
LYMPHOCYTES # BLD: 4.3 K/UL (ref 0.9–3.6)
LYMPHOCYTES NFR BLD: 40 % (ref 21–52)
MCH RBC QN AUTO: 30.4 PG (ref 24–34)
MCHC RBC AUTO-ENTMCNC: 33.6 G/DL (ref 31–37)
MCV RBC AUTO: 90.4 FL (ref 74–97)
MONOCYTES # BLD: 0.8 K/UL (ref 0.05–1.2)
MONOCYTES NFR BLD: 7 % (ref 3–10)
NEUTS SEG # BLD: 5.6 K/UL (ref 1.8–8)
NEUTS SEG NFR BLD: 52 % (ref 40–73)
NITRITE UR QL STRIP.AUTO: NEGATIVE
PH UR STRIP: 5.5 [PH] (ref 5–8)
PLATELET # BLD AUTO: 239 K/UL (ref 135–420)
PMV BLD AUTO: 10 FL (ref 9.2–11.8)
POTASSIUM SERPL-SCNC: 4 MMOL/L (ref 3.5–5.5)
PROT SERPL-MCNC: 6.5 G/DL (ref 6.4–8.2)
PROT UR STRIP-MCNC: NEGATIVE MG/DL
RBC # BLD AUTO: 4.8 M/UL (ref 4.7–5.5)
RBC #/AREA URNS HPF: 0 /HPF (ref 0–5)
SODIUM SERPL-SCNC: 142 MMOL/L (ref 136–145)
SP GR UR REFRACTOMETRY: >1.03 (ref 1–1.03)
T4 FREE SERPL-MCNC: 1.1 NG/DL (ref 0.7–1.5)
TRIGL SERPL-MCNC: 340 MG/DL (ref ?–150)
TSH SERPL DL<=0.05 MIU/L-ACNC: 3.07 UIU/ML (ref 0.36–3.74)
UROBILINOGEN UR QL STRIP.AUTO: 1 EU/DL (ref 0.2–1)
VLDLC SERPL CALC-MCNC: 68 MG/DL
WBC # BLD AUTO: 10.7 K/UL (ref 4.6–13.2)
WBC URNS QL MICRO: ABNORMAL /HPF (ref 0–4)

## 2017-10-19 PROCEDURE — 81001 URINALYSIS AUTO W/SCOPE: CPT | Performed by: NURSE PRACTITIONER

## 2017-10-19 PROCEDURE — 85025 COMPLETE CBC W/AUTO DIFF WBC: CPT | Performed by: NURSE PRACTITIONER

## 2017-10-19 PROCEDURE — 83036 HEMOGLOBIN GLYCOSYLATED A1C: CPT | Performed by: NURSE PRACTITIONER

## 2017-10-19 PROCEDURE — 84443 ASSAY THYROID STIM HORMONE: CPT | Performed by: NURSE PRACTITIONER

## 2017-10-19 PROCEDURE — 84439 ASSAY OF FREE THYROXINE: CPT | Performed by: NURSE PRACTITIONER

## 2017-10-19 PROCEDURE — 80053 COMPREHEN METABOLIC PANEL: CPT | Performed by: NURSE PRACTITIONER

## 2017-10-19 PROCEDURE — 82306 VITAMIN D 25 HYDROXY: CPT | Performed by: NURSE PRACTITIONER

## 2017-10-19 PROCEDURE — 36415 COLL VENOUS BLD VENIPUNCTURE: CPT | Performed by: NURSE PRACTITIONER

## 2017-10-19 PROCEDURE — 80061 LIPID PANEL: CPT | Performed by: NURSE PRACTITIONER

## 2017-10-19 RX ORDER — BACLOFEN 10 MG/1
10 TABLET ORAL 3 TIMES DAILY
Qty: 90 TAB | Refills: 0 | Status: SHIPPED | OUTPATIENT
Start: 2017-10-19 | End: 2018-01-18 | Stop reason: ALTCHOICE

## 2017-10-19 RX ORDER — DICLOFENAC SODIUM 50 MG/1
50 TABLET, DELAYED RELEASE ORAL 2 TIMES DAILY
Qty: 60 TAB | Refills: 0 | Status: SHIPPED | OUTPATIENT
Start: 2017-10-19 | End: 2017-11-21 | Stop reason: SDUPTHER

## 2017-10-19 NOTE — PROGRESS NOTES
Pieter Lyon is a 44 y.o.  male presents today for office visit for establish care. Pt is in Room # 8      1. Have you been to the ER, urgent care clinic since your last visit? Hospitalized since your last visit? No    2. Have you seen or consulted any other health care providers outside of the [address] [place] [address] since your last visit? Include any pap smears or colon screening. No    Health Maintenance reviewed patient received flu vx          Pieter Lyon is a 44 y.o. male who presents for routine immunizations. He denies any symptoms , reactions or allergies that would exclude them from being immunized today. Risks and adverse reactions were discussed and the VIS was given to them. All questions were addressed. He was observed for 10 min post injection. There were no reactions observed.     Desirae Bolivar LPN    VORB: Administer Flulaval via IM injection once./Sallie Ventura/ Desirae Bolivar LPN

## 2017-10-19 NOTE — PATIENT INSTRUCTIONS
Vaccine Information Statement    Influenza (Flu) Vaccine (Inactivated or Recombinant): What you need to know    Many Vaccine Information Statements are available in Welsh and other languages. See www.immunize.org/vis  Hojas de Información Sobre Vacunas están disponibles en Español y en muchos otros idiomas. Visite www.immunize.org/vis    1. Why get vaccinated? Influenza (flu) is a contagious disease that spreads around the [place] every year, usually between October and May. Flu is caused by influenza viruses, and is spread mainly by coughing, sneezing, and close contact. Anyone can get flu. Flu strikes suddenly and can last several days. Symptoms vary by age, but can include:   fever/chills   sore throat   muscle aches   fatigue   cough   headache    runny or stuffy nose    Flu can also lead to pneumonia and blood infections, and cause diarrhea and seizures in children. If you have a medical condition, such as heart or lung disease, flu can make it worse. Flu is more dangerous for some people. Infants and young children, people 72years of age and older, pregnant women, and people with certain health conditions or a weakened immune system are at greatest risk. Each year thousands of people in the [facility] die from flu, and many more are hospitalized. Flu vaccine can:   keep you from getting flu,   make flu less severe if you do get it, and   keep you from spreading flu to your family and other people. 2. Inactivated and recombinant flu vaccines    A dose of flu vaccine is recommended every flu season. Children 6 months through 6years of age may need two doses during the same flu season. Everyone else needs only one dose each flu season.        Some inactivated flu vaccines contain a very small amount of a mercury-based preservative called thimerosal. Studies have not shown thimerosal in vaccines to be harmful, but flu vaccines that do not contain thimerosal are available. There is no live flu virus in flu shots. They cannot cause the flu. There are many flu viruses, and they are always changing. Each year a new flu vaccine is made to protect against three or four viruses that are likely to cause disease in the upcoming flu season. But even when the vaccine doesnt exactly match these viruses, it may still provide some protection    Flu vaccine cannot prevent:   flu that is caused by a virus not covered by the vaccine, or   illnesses that look like flu but are not. It takes about 2 weeks for protection to develop after vaccination, and protection lasts through the flu season. 3. Some people should not get this vaccine    Tell the person who is giving you the vaccine:     If you have any severe, life-threatening allergies. If you ever had a life-threatening allergic reaction after a dose of flu vaccine, or have a severe allergy to any part of this vaccine, you may be advised not to get vaccinated. Most, but not all, types of flu vaccine contain a small amount of egg protein.  If you ever had Guillain-Barré Syndrome (also called GBS). Some people with a history of GBS should not get this vaccine. This should be discussed with your doctor.  If you are not feeling well. It is usually okay to get flu vaccine when you have a mild illness, but you might be asked to come back when you feel better. 4. Risks of a vaccine reaction    With any medicine, including vaccines, there is a chance of reactions. These are usually mild and go away on their own, but serious reactions are also possible. Most people who get a flu shot do not have any problems with it.      Minor problems following a flu shot include:    soreness, redness, or swelling where the shot was given     hoarseness   sore, red or itchy eyes   cough   fever   aches   headache   itching   fatigue  If these problems occur, they usually begin soon after the shot and last 1 or 2 days. More serious problems following a flu shot can include the following:     There may be a small increased risk of Guillain-Barré Syndrome (GBS) after inactivated flu vaccine. This risk has been estimated at 1 or 2 additional cases per million people vaccinated. This is much lower than the risk of severe complications from flu, which can be prevented by flu vaccine.  Young children who get the flu shot along with pneumococcal vaccine (PCV13) and/or DTaP vaccine at the same time might be slightly more likely to have a seizure caused by fever. Ask your doctor for more information. Tell your doctor if a child who is getting flu vaccine has ever had a seizure. Problems that could happen after any injected vaccine:      People sometimes faint after a medical procedure, including vaccination. Sitting or lying down for about 15 minutes can help prevent fainting, and injuries caused by a fall. Tell your doctor if you feel dizzy, or have vision changes or ringing in the ears.  Some people get severe pain in the shoulder and have difficulty moving the arm where a shot was given. This happens very rarely.  Any medication can cause a severe allergic reaction. Such reactions from a vaccine are very rare, estimated at about 1 in a million doses, and would happen within a few minutes to a few hours after the vaccination. As with any medicine, there is a very remote chance of a vaccine causing a serious injury or death. The safety of vaccines is always being monitored. For more information, visit: www.cdc.gov/vaccinesafety/    5. What if there is a serious reaction? What should I look for?  Look for anything that concerns you, such as signs of a severe allergic reaction, very high fever, or unusual behavior.     Signs of a severe allergic reaction can include hives, swelling of the face and throat, difficulty breathing, a fast heartbeat, dizziness, and weakness - usually within a few minutes to a few hours after the vaccination. What should I do?  If you think it is a severe allergic reaction or other emergency that cant wait, call 9-1-1 and get the person to the nearest hospital. Otherwise, call your doctor.  Reactions should be reported to the Vaccine Adverse Event Reporting System (VAERS). Your doctor should file this report, or you can do it yourself through  the VAERS web site at www.vaers. [facility].gov, or by calling 9-504.301.2230. VAERS does not give medical advice. 6. The National Vaccine Injury Compensation Program    The [facility] Vaccine Injury Compensation Program (VICP) is a federal program that was created to compensate people who may have been injured by certain vaccines. Persons who believe they may have been injured by a vaccine can learn about the program and about filing a claim by calling 3-574.846.7784 or visiting the [employer] website at www.[facility].gov/vaccinecompensation. There is a time limit to file a claim for compensation. 7. How can I learn more?  Ask your healthcare provider. He or she can give you the vaccine package insert or suggest other sources of information.  Call your local or state health department.  Contact the Centers for Disease Control and Prevention (CDC):  - Call 5-657.779.2783 (1-800-CDC-INFO) or  - Visit CDCs website at www.cdc.gov/flu    Vaccine Information Statement   Inactivated Influenza Vaccine   8/7/2015  42 EDENILSON Aguilar [id number]    Department of Health and Human Services  Centers for Disease Control and Prevention    Office Use Only       Learning About Relief for Back Pain  What is back tension and strain? Back strain happens when you overstretch, or pull, a muscle in your back. You may hurt your back in an accident or when you exercise or lift something. Most back pain will get better with rest and time. You can take care of yourself at home to help your back heal.  What can you do first to relieve back pain?   When you first feel back pain, try these steps:  · Walk. Take a short walk (10 to 20 minutes) on a level surface (no slopes, hills, or stairs) every 2 to 3 hours. Walk only distances you can manage without pain, especially leg pain. · Relax. Find a comfortable position for rest. Some people are comfortable on the floor or a medium-firm bed with a small pillow under their head and another under their knees. Some people prefer to lie on their side with a pillow between their knees. Don't stay in one position for too long. · Try heat or ice. Try using a heating pad on a low or medium setting, or take a warm shower, for 15 to 20 minutes every 2 to 3 hours. Or you can buy single-use heat wraps that last up to 8 hours. You can also try an ice pack for 10 to 15 minutes every 2 to 3 hours. You can use an ice pack or a bag of frozen vegetables wrapped in a thin towel. There is not strong evidence that either heat or ice will help, but you can try them to see if they help. You may also want to try switching between heat and cold. · Take pain medicine exactly as directed. ¨ If the doctor gave you a prescription medicine for pain, take it as prescribed. ¨ If you are not taking a prescription pain medicine, ask your doctor if you can take an over-the-counter medicine. What else can you do? · Stretch and exercise. Exercises that increase flexibility may relieve your pain and make it easier for your muscles to keep your spine in a good, neutral position. And don't forget to keep walking. · Do self-massage. You can use self-massage to unwind after work or school or to energize yourself in the morning. You can easily massage your feet, hands, or neck. Self-massage works best if you are in comfortable clothes and are sitting or lying in a comfortable position. Use oil or lotion to massage bare skin. · Reduce stress.  Back pain can lead to a vicious [place]: Distress about the pain tenses the muscles in your back, which in turn causes more pain. Learn how to relax your mind and your muscles to lower your stress. Where can you learn more? Go to http://anoop-adriana.info/. Enter A385 in the search box to learn more about \"Learning About Relief for Back Pain. \"  Current as of: March 21, 2017  Content Version: 11.3  © 9970-0452 [employer]. Care instructions adapted under license by [employer] (which disclaims liability or warranty for this information). If you have questions about a medical condition or this instruction, always ask your healthcare professional. [place] [address] any warranty or liability for your use of this information. Back Pain: Care Instructions  Your Care Instructions    Back pain has many possible causes. It is often related to problems with muscles and ligaments of the back. It may also be related to problems with the nerves, discs, or bones of the back. Moving, lifting, standing, sitting, or sleeping in an awkward way can strain the back. Sometimes you don't notice the injury until later. Arthritis is another common cause of back pain. Although it may hurt a lot, back pain usually improves on its own within several weeks. Most people recover in 12 weeks or less. Using good home treatment and being careful not to stress your back can help you feel better sooner. Follow-up care is a key part of your treatment and safety. Be sure to make and go to all appointments, and call your doctor if you are having problems. Its also a good idea to know your test results and keep a list of the medicines you take. How can you care for yourself at home? · Sit or lie in positions that are most comfortable and reduce your pain. Try one of these positions when you lie down:  ¨ Lie on your back with your knees bent and supported by large pillows. ¨ Lie on the floor with your legs on the seat of a sofa or chair.   Sayra Settle on your side with your knees and hips bent and a pillow between your legs. ¨ Lie on your stomach if it does not make pain worse. · Do not sit up in bed, and avoid soft couches and twisted positions. Bed rest can help relieve pain at first, but it delays healing. Avoid bed rest after the first day of back pain. · Change positions every 30 minutes. If you must sit for long periods of time, take breaks from sitting. Get up and walk around, or lie in a comfortable position. · Try using a heating pad on a low or medium setting for 15 to 20 minutes every 2 or 3 hours. Try a warm shower in place of one session with the heating pad. · You can also try an ice pack for 10 to 15 minutes every 2 to 3 hours. Put a thin cloth between the ice pack and your skin. · Take pain medicines exactly as directed. ¨ If the doctor gave you a prescription medicine for pain, take it as prescribed. ¨ If you are not taking a prescription pain medicine, ask your doctor if you can take an over-the-counter medicine. · Take short walks several times a day. You can start with 5 to 10 minutes, 3 or 4 times a day, and work up to longer walks. Walk on level surfaces and avoid hills and stairs until your back is better. · Return to work and other activities as soon as you can. Continued rest without activity is usually not good for your back. · To prevent future back pain, do exercises to stretch and strengthen your back and stomach. Learn how to use good posture, safe lifting techniques, and proper body mechanics. When should you call for help? Call your doctor now or seek immediate medical care if:  · You have new or worsening numbness in your legs. · You have new or worsening weakness in your legs. (This could make it hard to stand up.)  · You lose control of your bladder or bowels. Watch closely for changes in your health, and be sure to contact your doctor if:  · Your pain gets worse. · You are not getting better after 2 weeks. Where can you learn more?   Go to http://anoop-adriana.info/. Enter U169 in the search box to learn more about \"Back Pain: Care Instructions. \"  Current as of: March 21, 2017  Content Version: 11.3  © 7809-8145 [employer]. Care instructions adapted under license by [employer] (which disclaims liability or warranty for this information). If you have questions about a medical condition or this instruction, always ask your healthcare professional. [address] any warranty or liability for your use of this information. Sciatica: Care Instructions  Your Care Instructions    Sciatica (say \"swa-PD-fp-kuh\") is an irritation of one of the sciatic nerves, which come from the spinal cord in the lower back. The sciatic nerves and their branches extend down through the buttock to the foot. Sciatica can develop when an injured disc in the back presses against a spinal nerve root. Its main symptom is pain, numbness, or weakness that is often worse in the leg or foot than in the back. Sciatica often will improve and go away with time. Early treatment usually includes medicines and exercises to relieve pain. Follow-up care is a key part of your treatment and safety. Be sure to make and go to all appointments, and call your doctor if you are having problems. It's also a good idea to know your test results and keep a list of the medicines you take. How can you care for yourself at home? · Take pain medicines exactly as directed. ¨ If the doctor gave you a prescription medicine for pain, take it as prescribed. ¨ If you are not taking a prescription pain medicine, ask your doctor if you can take an over-the-counter medicine. · Use heat or ice to relieve pain. ¨ To apply heat, put a warm water bottle, heating pad set on low, or warm cloth on your back. Do not go to sleep with a heating pad on your skin. ¨ To use ice, put ice or a cold pack on the area for 10 to 20 minutes at a time.  Put a thin cloth between the ice and your skin. · Avoid sitting if possible, unless it feels better than standing. · Alternate lying down with short walks. Increase your walking distance as you are able to without making your symptoms worse. · Do not do anything that makes your symptoms worse. When should you call for help? Call 911 anytime you think you may need emergency care. For example, call if:  · You are unable to move a leg at all. Call your doctor now or seek immediate medical care if:  · You have new or worse symptoms in your legs or buttocks. Symptoms may include:  ¨ Numbness or tingling. ¨ Weakness. ¨ Pain. · You lose bladder or bowel control. Watch closely for changes in your health, and be sure to contact your doctor if:  · You are not getting better as expected. Where can you learn more? Go to http://anoop-adriana.info/. Enter [phone] in the search box to learn more about \"Sciatica: Care Instructions. \"  Current as of: March 21, 2017  Content Version: 11.3  © 4159-3862 [employer]. Care instructions adapted under license by [employer] (which disclaims liability or warranty for this information). If you have questions about a medical condition or this instruction, always ask your healthcare professional. [address] any warranty or liability for your use of this information.
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
## 2017-10-23 ENCOUNTER — APPOINTMENT (OUTPATIENT)
Dept: PHYSICAL THERAPY | Age: 37
End: 2017-10-23
Payer: OTHER GOVERNMENT

## 2017-10-26 ENCOUNTER — APPOINTMENT (OUTPATIENT)
Dept: PHYSICAL THERAPY | Age: 37
End: 2017-10-26
Payer: OTHER GOVERNMENT

## 2017-11-07 NOTE — PROGRESS NOTES
[facility] PHYSICAL THERAPY  [address] [place] [address] Nelson Rasmussen, [address] - Phone: [phone]  Fax: [phone]      Dear Dr. Janneth Lopez MD,   Under your direction, we have been providing physical therapy for your patient Tammi Anaya, for a diagnosis of Lower back pain [M54.5]  Sciatica [M54.30]. Patient has not returned since his last PN, nor has he communicated with us further clarification re: his f/u with you re: care. Due to clinic policy, lack of MD signature to PN, and his change in status as of his last PT appointment, we will DC him at this time. We appreciate the kind referral and would willingly work with this patient again, should he be able to arrange regular attendance and is appropriate for further treatment. Your patient's health is our primary concern. If you have any questions/comments please contact us directly at [phone]. Thank you for allowing us to assist in the care of your patient. Therapist Signature: Kelly Baez, JOSEPHT, Cert. MDT, Cert. DN, Cert. SMT Date: 29/3/8376   Certification Period n/a Time: 1:21 PM   Reporting Period n/a     NOTE TO PHYSICIAN:  PLEASE COMPLETE THE ORDERS BELOW AND FAX TO   [facility] Physical Therapy: [phone]  If you are unable to process this request in 24 hours please contact our office: [phone]    ___ I have read the above report and request that my patient continue as recommended.   ___ I have read the above report and request that my patient continue therapy with the following changes/special instructions:_________________________________________________________   ___ I have read the above report and request that my patient be discharged from therapy.      Physician Signature:        Date:       Time:

## 2017-11-09 ENCOUNTER — TELEPHONE (OUTPATIENT)
Dept: FAMILY MEDICINE CLINIC | Facility: CLINIC | Age: 37
End: 2017-11-09

## 2017-11-09 DIAGNOSIS — E78.89 LIPIDS ABNORMAL: Primary | ICD-10-CM

## 2017-11-09 NOTE — TELEPHONE ENCOUNTER
Spoke with pt in regards to lab results. Two pt identifier's and permission to release verified. Relayed NP.'s notes. Pt acknowledges understanding and states he is agreeable to having BW performed. Pt  voices no concerns at this time.      VORB: Lipid panel via venipuncture./Sallie Bello NP/Leah Javier LPN

## 2017-11-09 NOTE — PROGRESS NOTES
Your vitamin D level is low at 9.2 normal is  you have vitamin D deficiency- a prescription for vitamin D replacement was sent to your pharmacy- please take as directed. thy  Your thyroid labs are normal.  Your hemoglobin ALC is normal  Your cholesterol labs are abnormal- your good cholesterol is low at 37- normal is 40-60,  Your bad cholesterol is 111 normal is less than 100 your triglyceride is 340 normal is less than 150. Make sure you fast at least 12 hours before you get your cholesterol blood draw. Also start lifestyle modifications life lose weight, eat less fatty foods, and exercise.

## 2017-11-21 ENCOUNTER — OFFICE VISIT (OUTPATIENT)
Dept: ORTHOPEDIC SURGERY | Age: 37
End: 2017-11-21

## 2017-11-21 VITALS
HEIGHT: 66 IN | TEMPERATURE: 98.7 F | BODY MASS INDEX: 39.63 KG/M2 | SYSTOLIC BLOOD PRESSURE: 147 MMHG | HEART RATE: 94 BPM | RESPIRATION RATE: 16 BRPM | WEIGHT: 246.6 LBS | DIASTOLIC BLOOD PRESSURE: 85 MMHG

## 2017-11-21 DIAGNOSIS — M51.26 HNP (HERNIATED NUCLEUS PULPOSUS), LUMBAR: Primary | ICD-10-CM

## 2017-11-21 DIAGNOSIS — M79.2 NEURITIS: ICD-10-CM

## 2017-11-21 DIAGNOSIS — M54.16 LUMBAR RADICULOPATHY: ICD-10-CM

## 2017-11-21 DIAGNOSIS — M62.838 MUSCLE SPASM: ICD-10-CM

## 2017-11-21 RX ORDER — TOPIRAMATE 25 MG/1
TABLET ORAL
Qty: 90 TAB | Refills: 1 | Status: SHIPPED | OUTPATIENT
Start: 2017-11-21 | End: 2017-11-28 | Stop reason: SDUPTHER

## 2017-11-21 RX ORDER — SERTRALINE HYDROCHLORIDE 25 MG/1
25 TABLET, FILM COATED ORAL DAILY
COMMUNITY

## 2017-11-21 RX ORDER — OMEPRAZOLE 20 MG/1
20 CAPSULE, DELAYED RELEASE ORAL DAILY
COMMUNITY

## 2017-11-21 RX ORDER — DICLOFENAC SODIUM 75 MG/1
75 TABLET, DELAYED RELEASE ORAL 2 TIMES DAILY
COMMUNITY
End: 2018-01-18 | Stop reason: ALTCHOICE

## 2017-11-21 RX ORDER — METAXALONE 800 MG/1
TABLET ORAL
Qty: 60 TAB | Refills: 2 | Status: SHIPPED | OUTPATIENT
Start: 2017-11-21 | End: 2017-11-28 | Stop reason: SDUPTHER

## 2017-11-21 RX ORDER — METHYLPREDNISOLONE 4 MG/1
TABLET ORAL
Qty: 1 DOSE PACK | Refills: 0 | Status: SHIPPED | OUTPATIENT
Start: 2017-11-21 | End: 2017-11-28 | Stop reason: SDUPTHER
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
## 2017-11-21 NOTE — MR AVS SNAPSHOT
Visit Information Date & Time Provider Department Dept. Phone Encounter #  
 11/21/2017  2:00 PM Paul Richards MD [facility] [phone] Follow-up Instructions Return in about 6 weeks (around 1/2/2018) for Medication follow up, injection follow up. Your Appointments 1/15/2018  2:30 PM  
Follow Up with Paul Richards MD  
[facility] [address]) Appt Note: 6wk marcos fu; ADRIANE 12/6/17  
 [address] [facility] [address]  
[phone]  
  
   
 [address] [facility] [address] Upcoming Health Maintenance Date Due DTaP/Tdap/Td series (1 - Tdap) 5/11/2000 Influenza Age 5 to Adult 8/1/2017 Allergies as of 11/21/2017  Review Complete On: 11/21/2017 By: Paul Richards MD  
  
 Severity Noted Reaction Type Reactions Codeine  09/13/2017    Other (comments) Intolerance Current Immunizations  Never Reviewed No immunizations on file. Not reviewed this visit You Were Diagnosed With   
  
 Codes Comments HNP (herniated nucleus pulposus), lumbar    -  Primary ICD-10-CM: M51.26 
ICD-9-CM: 722.10 Neuritis     ICD-10-CM: M79.2 ICD-9-CM: 729.2 Muscle spasm     ICD-10-CM: F19.071 ICD-9-CM: 728.85 Vitals BP Pulse Temp Resp Height(growth percentile) Weight(growth percentile) 147/85 94 98.7 °F (37.1 °C) (Oral) 16 5' 6\" (1.676 m) 246 lb 9.6 oz (111.9 kg) BMI Smoking Status 39.8 kg/m2 Never Smoker BMI and BSA Data Body Mass Index Body Surface Area  
 39.8 kg/m 2 2.28 m 2 Preferred Pharmacy Pharmacy Name Phone [facility] [address], [address] [place], [place] [address] [phone] Your Updated Medication List  
  
   
This list is accurate as of: 11/21/17  3:42 PM.  Always use your most recent med list.  
  
  
  
  
 baclofen 10 mg tablet Commonly known as:  LIORESAL Take 1 Tab by mouth three (3) times daily. diclofenac EC 75 mg EC tablet Commonly known as:  VOLTAREN Take 75 mg by mouth two (2) times a day. FISH -1,000 mg capsule Generic drug:  fish oil-omega-3 fatty acids Take 1,000 mg by mouth. HYDROcodone-acetaminophen 5-325 mg per tablet Commonly known as:  Dago Lin Take 1-2 tablets PO every 4-6 hours as needed for pain control. If over the counter ibuprofen or acetaminophen was suggested, then only take the vicodin for pain not well controlled with the over the counter medication. methylPREDNISolone 4 mg tablet Commonly known as:  Ely Henry Per dose pack instructions  
  
 omeprazole 20 mg capsule Commonly known as:  PRILOSEC Take 20 mg by mouth daily. sertraline 25 mg tablet Commonly known as:  ZOLOFT Take 25 mg by mouth daily. topiramate 25 mg tablet Commonly known as:  TOPAMAX Take 1 in the evening for 1 week, then increase to 2 the second week and continue with 3 in the evening VITAMINS AND MINERALS tablet Generic drug:  therapeutic multivitamin-minerals Take 1 Tab by mouth. Prescriptions Sent to Pharmacy Refills  
 topiramate (TOPAMAX) 25 mg tablet 1 Sig: Take 1 in the evening for 1 week, then increase to 2 the second week and continue with 3 in the evening Class: Normal  
 Pharmacy: [id number], [address] Ph #: [phone]  
 methylPREDNISolone (MEDROL DOSEPACK) 4 mg tablet 0 Sig: Per dose pack instructions Class: Normal  
 Pharmacy: [id number], [address] Ph #: [phone] We Performed the Following SCHEDULE SURGERY [[id number] Custom] Follow-up Instructions Return in about 6 weeks (around 1/2/2018) for Medication follow up, injection follow up. Patient Instructions Herniated Disc: Exercises Your Care Instructions Here are some examples of typical rehabilitation exercises for your condition. Start each exercise slowly. Ease off the exercise if you start to have pain. Your doctor or physical therapist will tell you when you can start these exercises and which ones will work best for you. Back Exercises These exercises can help you move easier and feel better. But when you first start doing them, you may have more pain in your back. This is normal. But it is important to pay close attention to your pain during and after each exercise. · Keep doing these exercises if your pain stays the same or moves from your leg and buttock more toward the middle of your spine. Pain moving out of your leg and buttock is a good sign. · Stop doing these exercises if your pain gets worse in your leg and buttock. Stop if you start to have pain in your leg and buttock that you didn't have before. Be sure to do these exercises in the order they appear. Note how your pain changes before you move to the next one. If your pain is much worse right after exercise and stays worse the next day, do not do any of these exercises. How to do the exercises 1. Rest on belly 1. Lie on your stomach, face down, with your head turned to the side. Place your arms beside your body. If this bothers your neck, place your hands, one on top of the other, underneath your forehead. This will help support your head and neck. 2. Try to relax your lower back muscles as much as you can. 3. Continue to lie on your stomach for 2 minutes. 4. If your pain spreads down your leg or increases down your leg, stop this exercise and do not do the next exercises. 2. Press-up 1. Lie on your stomach, face down. Keep your elbows tucked into your sides and under your shoulders. 2. Press your elbows down into the floor to raise your upper back.  As you do this, relax your stomach muscles. Allow your back to arch without using your back muscles. Let your low back relax completely as you arch up. 3. Hold this position for 2 minutes. 4. Repeat 2 to 4 times. 5. If your pain spreads down your leg or increases down your leg, stop this exercise and do not do the next exercises. 3. Full press-up 1. Lie on your stomach, face down. Keep your elbows tucked into your sides and under your shoulders. 2. Straighten your elbows, and push your upper body up as far as you can. Allow your lower back to sag. Keep your hips, pelvis, and legs relaxed. 3. Hold this position for 5 seconds, and then relax. 4. Repeat 10 times. Each time, try to raise your upper body a little higher and hold your arms a bit straighter. 5. If your pain spreads down your leg or gets worse down your leg, stop this exercise and do not move to the next exercise. 6. If you can't do this exercise, you may instead try the backward bend exercise that follows. 4. Backward bend 1. Stand with your feet hip-width apart. Your toes should point forward. Do not lock your knees. 2. Place your hands in the small of your back. 3. Bend backward as far as you can, keeping your knees straight. Hold this position for 2 to 3 seconds. Then return to your starting position. 4. Repeat 2 to 4 times. Each time, try to bend backward a little farther, until you bend backward as far as you can. 5. If your pain spreads down your leg or increases down your leg, stop this exercise. Follow-up care is a key part of your treatment and safety. Be sure to make and go to all appointments, and call your doctor if you are having problems. It's also a good idea to know your test results and keep a list of the medicines you take. Where can you learn more? Go to http://anoop-adriana.info/. Enter [phone] [phone] [id number] [phone] in the search box to learn more about \"Herniated Disc: Exercises. \" Current as of: March 21, 2017 Content Version: 11.4 © 8458-8658 Healthwise, [employer]. Care instructions adapted under license by [employer] (which disclaims liability or warranty for this information). If you have questions about a medical condition or this instruction, always ask your healthcare professional. [address] any warranty or liability for your use of this information. Introducing [facility]! Steffen Mantilla introduces [employer] patient portal. Now you can access parts of your medical record, email your doctor's office, and request medication refills online. 1. In your internet browser, go to [email or web address] 2. Click on the First Time User? Click Here link in the Sign In box. You will see the New Member Sign Up page. 3. Enter your [employer] Access Code exactly as it appears below. You will not need to use this code after youve completed the sign-up process. If you do not sign up before the expiration date, you must request a new code. · [employer] Access Code: [email or web address] Expires: 12/5/2017  3:11 PM 
 
4. Enter the last four digits of your Social Security Number (xxxx) and Date of Birth (mm/dd/yyyy) as indicated and click Submit. You will be taken to the next sign-up page. 5. Create a [employer] ID. This will be your [employer] login ID and cannot be changed, so think of one that is secure and easy to remember. 6. Create a [employer] password. You can change your password at any time. 7. Enter your Password Reset Question and Answer. This can be used at a later time if you forget your password. 8. Enter your e-mail address. You will receive e-mail notification when new information is available in [address] 9. Click Sign Up. You can now view and download portions of your medical record. 10. Click the Download Summary menu link to download a portable copy of your medical information. If you have questions, please visit the Frequently Asked Questions section of the [employer] website. Remember, [employer] is NOT to be used for urgent needs. For medical emergencies, dial 911. Now available from your iPhone and Android! Please provide this summary of care documentation to your next provider. Your primary care clinician is listed as Tien Schroeder. If you have any questions after today's visit, please call [phone].

## 2017-11-21 NOTE — PATIENT INSTRUCTIONS
Herniated Disc: Exercises  Your Care Instructions  Here are some examples of typical rehabilitation exercises for your condition. Start each exercise slowly. Ease off the exercise if you start to have pain. Your doctor or physical therapist will tell you when you can start these exercises and which ones will work best for you. Back Exercises  These exercises can help you move easier and feel better. But when you first start doing them, you may have more pain in your back. This is normal. But it is important to pay close attention to your pain during and after each exercise. · Keep doing these exercises if your pain stays the same or moves from your leg and buttock more toward the middle of your spine. Pain moving out of your leg and buttock is a good sign. · Stop doing these exercises if your pain gets worse in your leg and buttock. Stop if you start to have pain in your leg and buttock that you didn't have before. Be sure to do these exercises in the order they appear. Note how your pain changes before you move to the next one. If your pain is much worse right after exercise and stays worse the next day, do not do any of these exercises. How to do the exercises  1. Rest on belly    1. Lie on your stomach, face down, with your head turned to the side. Place your arms beside your body. If this bothers your neck, place your hands, one on top of the other, underneath your forehead. This will help support your head and neck. 2. Try to relax your lower back muscles as much as you can. 3. Continue to lie on your stomach for 2 minutes. 4. If your pain spreads down your leg or increases down your leg, stop this exercise and do not do the next exercises. 2. Press-up    1. Lie on your stomach, face down. Keep your elbows tucked into your sides and under your shoulders. 2. Press your elbows down into the floor to raise your upper back. As you do this, relax your stomach muscles.  Allow your back to arch without using your back muscles. Let your low back relax completely as you arch up. 3. Hold this position for 2 minutes. 4. Repeat 2 to 4 times. 5. If your pain spreads down your leg or increases down your leg, stop this exercise and do not do the next exercises. 3. Full press-up    1. Lie on your stomach, face down. Keep your elbows tucked into your sides and under your shoulders. 2. Straighten your elbows, and push your upper body up as far as you can. Allow your lower back to sag. Keep your hips, pelvis, and legs relaxed. 3. Hold this position for 5 seconds, and then relax. 4. Repeat 10 times. Each time, try to raise your upper body a little higher and hold your arms a bit straighter. 5. If your pain spreads down your leg or gets worse down your leg, stop this exercise and do not move to the next exercise. 6. If you can't do this exercise, you may instead try the backward bend exercise that follows. 4. Backward bend    1. Stand with your feet hip-width apart. Your toes should point forward. Do not lock your knees. 2. Place your hands in the small of your back. 3. Bend backward as far as you can, keeping your knees straight. Hold this position for 2 to 3 seconds. Then return to your starting position. 4. Repeat 2 to 4 times. Each time, try to bend backward a little farther, until you bend backward as far as you can. 5. If your pain spreads down your leg or increases down your leg, stop this exercise. Follow-up care is a key part of your treatment and safety. Be sure to make and go to all appointments, and call your doctor if you are having problems. It's also a good idea to know your test results and keep a list of the medicines you take. Where can you learn more? Go to http://anoop-adriana.info/. Enter [phone] [phone] [id number] in the search box to learn more about \"Herniated Disc: Exercises. \"  Current as of: March 21, 2017  Content Version: 11.4  © 6982-2161 Healthwise, [employer].  Care instructions adapted under license by [employer] (which disclaims liability or warranty for this information). If you have questions about a medical condition or this instruction, always ask your healthcare professional. [address] any warranty or liability for your use of this information.

## 2017-11-22 ENCOUNTER — TELEPHONE (OUTPATIENT)
Dept: ORTHOPEDIC SURGERY | Age: 37
End: 2017-11-22

## 2017-11-22 DIAGNOSIS — M62.838 MUSCLE SPASM: ICD-10-CM

## 2017-11-22 DIAGNOSIS — M51.26 HNP (HERNIATED NUCLEUS PULPOSUS), LUMBAR: ICD-10-CM

## 2017-11-22 DIAGNOSIS — M79.2 NEURITIS: ICD-10-CM

## 2017-11-22 NOTE — TELEPHONE ENCOUNTER
Patient needs Dr. Dillan Issa to send all meds from yesterday visit, the Topamax, Medrol Dose Pack and the Skelaxin to the [place] [address] due to the high cost of these meds. Please call [facility] [phone] to get the fax.   Please call patient back at [phone]

## 2017-11-22 NOTE — TELEPHONE ENCOUNTER
I spoke to the patient and explained to him that we dont fax RX's to the [place] but we will be willing to print them and he can come pick them up from the office and hand carry them. Patient was fine with this and stated he will pickup on Tuesday.

## 2017-11-28 ENCOUNTER — TELEPHONE (OUTPATIENT)
Dept: ORTHOPEDIC SURGERY | Age: 37
End: 2017-11-28

## 2017-11-28 RX ORDER — METAXALONE 800 MG/1
TABLET ORAL
Qty: 60 TAB | Refills: 2 | Status: SHIPPED | OUTPATIENT
Start: 2017-11-28 | End: 2018-01-18 | Stop reason: ALTCHOICE

## 2017-11-28 RX ORDER — TOPIRAMATE 25 MG/1
TABLET ORAL
Qty: 90 TAB | Refills: 1 | Status: SHIPPED | OUTPATIENT
Start: 2017-11-28

## 2017-11-28 RX ORDER — METHYLPREDNISOLONE 4 MG/1
TABLET ORAL
Qty: 1 DOSE PACK | Refills: 0 | Status: SHIPPED | OUTPATIENT
Start: 2017-11-28 | End: 2018-01-18 | Stop reason: ALTCHOICE

## 2017-11-28 NOTE — TELEPHONE ENCOUNTER
Spoke with Dr. Dillan Issa and she is giving the patient written RX's. Escribed  Rx for Skelaxin, Medrol dose Damaso and Topamax cancelled at [facility] and Patient came in and picked up written Rx's to take to the [place].

## 2017-11-28 NOTE — TELEPHONE ENCOUNTER
Patient called to state that he received block today 11/28/17 Epidural L5/S1 by [facility], Dr. Ford Perez. Cancelled block with us; [facility] unsure [facility]. Patient cancelled follow-up with Dr. Mo Richey as well.

## 2017-12-07 ENCOUNTER — TELEPHONE (OUTPATIENT)
Dept: ORTHOPEDIC SURGERY | Age: 37
End: 2017-12-07

## 2017-12-07 NOTE — TELEPHONE ENCOUNTER
Kely from the [address] called stating they do not have the prescription metaxalone (SKELAXIN) 800 mg tablet in stock and she was wondering if Dr. Mathew Finn wouldn't mind changing the prescription to either Methocarbamol or Cyclobenzaprine. Please advise Kely at [phone] ext. [phone].

## 2017-12-08 RX ORDER — METHOCARBAMOL 500 MG/1
TABLET, FILM COATED ORAL
Qty: 45 TAB | Refills: 0 | OUTPATIENT
Start: 2017-12-08

## 2017-12-08 NOTE — TELEPHONE ENCOUNTER
Skelaxin was changed to Robaxin which  was called into the [place] pharmacy spoke to [place] .

## 2017-12-21 ENCOUNTER — OFFICE VISIT (OUTPATIENT)
Dept: FAMILY MEDICINE CLINIC | Facility: CLINIC | Age: 37
End: 2017-12-21

## 2017-12-21 VITALS
SYSTOLIC BLOOD PRESSURE: 106 MMHG | HEART RATE: 88 BPM | OXYGEN SATURATION: 96 % | DIASTOLIC BLOOD PRESSURE: 70 MMHG | HEIGHT: 66 IN | WEIGHT: 236.2 LBS | RESPIRATION RATE: 15 BRPM | TEMPERATURE: 98.9 F | BODY MASS INDEX: 37.96 KG/M2

## 2017-12-21 DIAGNOSIS — M51.27 HERNIATED NUCLEUS PULPOSUS OF LUMBOSACRAL REGION: ICD-10-CM

## 2017-12-21 DIAGNOSIS — Z23 ENCOUNTER FOR IMMUNIZATION: Primary | ICD-10-CM

## 2017-12-21 RX ORDER — TOPIRAMATE 50 MG/1
50 TABLET, FILM COATED ORAL DAILY
COMMUNITY
End: 2018-01-18 | Stop reason: ALTCHOICE

## 2017-12-21 NOTE — PROGRESS NOTES
Rukhsana Hairston is a 44 y.o.  male presents today for office visit for routine follow up. Pt would also like to discuss restarting physical therapy. Pt is in Room # 4. Pt is non fasting. This patient is accompanied in the office by his spouse. 1. Have you been to the ER, urgent care clinic since your last visit? Hospitalized since your last visit? No    2. Have you seen or consulted any other health care providers outside of the [address] [place] [address] since your last visit? Include any pap smears or colon screening. Yes left L5/S1 Interlaminar Epidural Steroid Injection; (Under Fluoroscopic Guidance)    [facility], Dr. Lowell Motta 11/28/17    Health Maintenance reviewed. Upcoming [address] [place] [address] - 2/2018      VORB:   Orders Placed This Encounter    Influenza virus vaccine (QUADRIVALENT PRES FREE SYRINGE) IM (04032)   TERESA Cagle , GRACE        Rukhsana Hairston is a 44 y.o. male who presents for routine immunizations. He denies any symptoms , reactions or allergies that would exclude them from being immunized today. Risks and adverse reactions were discussed and the VIS was given to them. All questions were addressed. He was observed for 10 min post injection. There were no reactions observed.     Sofie Purdy

## 2017-12-21 NOTE — MR AVS SNAPSHOT
Visit Information Date & Time Provider Department Dept. Phone Encounter #  
 12/21/2017  3:00 PM Damian Weller NP [facility] [phone] [id number] Follow-up Instructions Return in about 1 month (around 1/21/2018), or if symptoms worsen or fail to improve. Upcoming Health Maintenance Date Due DTaP/Tdap/Td series (1 - Tdap) 5/11/2000 Influenza Age 5 to Adult 8/1/2017 Allergies as of 12/21/2017  Review Complete On: 12/21/2017 By: Alejandra Mclaughlin Severity Noted Reaction Type Reactions Codeine  09/13/2017    Other (comments) Intolerance Current Immunizations  Never Reviewed Name Date Influenza Vaccine (Quad) PF  Incomplete Not reviewed this visit You Were Diagnosed With   
  
 Codes Comments Encounter for immunization    -  Primary ICD-10-CM: L93 ICD-9-CM: V03.89 Herniated nucleus pulposus of lumbosacral region     ICD-10-CM: M51.27 ICD-9-CM: 722.10 Vitals BP Pulse Temp Resp Height(growth percentile) Weight(growth percentile) 106/70 (BP 1 Location: Left arm, BP Patient Position: Sitting) 88 98.9 °F (37.2 °C) (Oral) 15 5' 6\" (1.676 m) 236 lb 3.2 oz (107.1 kg) SpO2 BMI Smoking Status 96% 38.12 kg/m2 Never Smoker Vitals History BMI and BSA Data Body Mass Index Body Surface Area  
 38.12 kg/m 2 2.23 m 2 Preferred Pharmacy Pharmacy Name Phone [facility] [address], [address] [place] [address] [phone] Your Updated Medication List  
  
   
This list is accurate as of: 12/21/17  4:22 PM.  Always use your most recent med list.  
  
  
  
  
 baclofen 10 mg tablet Commonly known as:  LIORESAL Take 1 Tab by mouth three (3) times daily. diclofenac EC 75 mg EC tablet Commonly known as:  VOLTAREN Take 75 mg by mouth two (2) times a day. FISH -1,000 mg capsule Generic drug:  fish oil-omega-3 fatty acids Take 1,000 mg by mouth daily. HYDROcodone-acetaminophen 5-325 mg per tablet Commonly known as:  1463 [address] Fagan Take 1-2 tablets PO every 4-6 hours as needed for pain control. If over the counter ibuprofen or acetaminophen was suggested, then only take the vicodin for pain not well controlled with the over the counter medication. metaxalone 800 mg tablet Commonly known as:  SKELAXIN Take 1 tab by mouth BID-TID PRN  Indications: Muscle Spasm  
  
 methocarbamol 500 mg tablet Commonly known as:  ROBAXIN Take 1 po tid prn spasms  
  
 methylPREDNISolone 4 mg tablet Commonly known as:  Nathaniel Drones Per dose pack instructions  
  
 omeprazole 20 mg capsule Commonly known as:  PRILOSEC Take 20 mg by mouth daily. sertraline 25 mg tablet Commonly known as:  ZOLOFT Take 25 mg by mouth daily. * TOPAMAX 50 mg tablet Generic drug:  topiramate Take 50 mg by mouth daily. * topiramate 25 mg tablet Commonly known as:  TOPAMAX Take 1 in the evening for 1 week, then increase to 2 the second week and continue with 3 in the evening VITAMINS AND MINERALS tablet Generic drug:  therapeutic multivitamin-minerals Take 1 Tab by mouth. * Notice: This list has 2 medication(s) that are the same as other medications prescribed for you. Read the directions carefully, and ask your doctor or other care provider to review them with you. We Performed the Following INFLUENZA VIRUS VAC QUAD,SPLIT,PRESV FREE SYRINGE IM [id number] CPT(R)] REFERRAL TO NEUROLOGY [RCX14 Custom] Comments:  
 Please evaluate patient for herniated nucleus pulposus. Possible diskectomy. Follow-up Instructions Return in about 1 month (around 1/21/2018), or if symptoms worsen or fail to improve. Referral Information Referral ID Referred By Referred To  
  
 [id number] [facility] [place] Not Available Visits Status Start Date End Date 1 New Request 12/21/17 12/21/18 If your referral has a status of pending review or denied, additional information will be sent to support the outcome of this decision. Patient Instructions Vaccine Information Statement Influenza (Flu) Vaccine (Inactivated or Recombinant): What you need to know Many Vaccine Information Statements are available in Malagasy and other languages. See www.immunize.org/vis Hojas de Información Sobre Vacunas están disponibles en Español y en muchos otros idiomas. Visite www.immunize.org/vis 1. Why get vaccinated? Influenza (flu) is a contagious disease that spreads around the [place] every year, usually between October and May. Flu is caused by influenza viruses, and is spread mainly by coughing, sneezing, and close contact. Anyone can get flu. Flu strikes suddenly and can last several days. Symptoms vary by age, but can include: 
 fever/chills  sore throat  muscle aches  fatigue  cough  headache  runny or stuffy nose Flu can also lead to pneumonia and blood infections, and cause diarrhea and seizures in children. If you have a medical condition, such as heart or lung disease, flu can make it worse. Flu is more dangerous for some people. Infants and young children, people 72years of age and older, pregnant women, and people with certain health conditions or a weakened immune system are at greatest risk. Each year thousands of people in the [facility] die from flu, and many more are hospitalized. Flu vaccine can: 
 keep you from getting flu, 
 make flu less severe if you do get it, and 
 keep you from spreading flu to your family and other people. 2. Inactivated and recombinant flu vaccines A dose of flu vaccine is recommended every flu season. Children 6 months through 6years of age may need two doses during the same flu season. Everyone else needs only one dose each flu season. Some inactivated flu vaccines contain a very small amount of a mercury-based preservative called thimerosal. Studies have not shown thimerosal in vaccines to be harmful, but flu vaccines that do not contain thimerosal are available. There is no live flu virus in flu shots. They cannot cause the flu. There are many flu viruses, and they are always changing. Each year a new flu vaccine is made to protect against three or four viruses that are likely to cause disease in the upcoming flu season. But even when the vaccine doesnt exactly match these viruses, it may still provide some protection Flu vaccine cannot prevent: 
 flu that is caused by a virus not covered by the vaccine, or 
 illnesses that look like flu but are not. It takes about 2 weeks for protection to develop after vaccination, and protection lasts through the flu season. 3. Some people should not get this vaccine Tell the person who is giving you the vaccine:  If you have any severe, life-threatening allergies. If you ever had a life-threatening allergic reaction after a dose of flu vaccine, or have a severe allergy to any part of this vaccine, you may be advised not to get vaccinated. Most, but not all, types of flu vaccine contain a small amount of egg protein.  If you ever had Guillain-Barré Syndrome (also called GBS). Some people with a history of GBS should not get this vaccine. This should be discussed with your doctor.  If you are not feeling well. It is usually okay to get flu vaccine when you have a mild illness, but you might be asked to come back when you feel better. 4. Risks of a vaccine reaction With any medicine, including vaccines, there is a chance of reactions. These are usually mild and go away on their own, but serious reactions are also possible. Most people who get a flu shot do not have any problems with it. Minor problems following a flu shot include:  soreness, redness, or swelling where the shot was given  hoarseness  sore, red or itchy eyes  cough  fever  aches  headache  itching  fatigue If these problems occur, they usually begin soon after the shot and last 1 or 2 days. More serious problems following a flu shot can include the following:  There may be a small increased risk of Guillain-Barré Syndrome (GBS) after inactivated flu vaccine. This risk has been estimated at 1 or 2 additional cases per million people vaccinated. This is much lower than the risk of severe complications from flu, which can be prevented by flu vaccine.  Young children who get the flu shot along with pneumococcal vaccine (PCV13) and/or DTaP vaccine at the same time might be slightly more likely to have a seizure caused by fever. Ask your doctor for more information. Tell your doctor if a child who is getting flu vaccine has ever had a seizure. Problems that could happen after any injected vaccine:  People sometimes faint after a medical procedure, including vaccination. Sitting or lying down for about 15 minutes can help prevent fainting, and injuries caused by a fall. Tell your doctor if you feel dizzy, or have vision changes or ringing in the ears.  Some people get severe pain in the shoulder and have difficulty moving the arm where a shot was given. This happens very rarely.  Any medication can cause a severe allergic reaction. Such reactions from a vaccine are very rare, estimated at about 1 in a million doses, and would happen within a few minutes to a few hours after the vaccination. As with any medicine, there is a very remote chance of a vaccine causing a serious injury or death. The safety of vaccines is always being monitored. For more information, visit: www.cdc.gov/vaccinesafety/ 
 
5. What if there is a serious reaction? What should I look for?  Look for anything that concerns you, such as signs of a severe allergic reaction, very high fever, or unusual behavior. Signs of a severe allergic reaction can include hives, swelling of the face and throat, difficulty breathing, a fast heartbeat, dizziness, and weakness  usually within a few minutes to a few hours after the vaccination. What should I do?  If you think it is a severe allergic reaction or other emergency that cant wait, call 9-1-1 and get the person to the nearest hospital. Otherwise, call your doctor.  Reactions should be reported to the Vaccine Adverse Event Reporting System (VAERS). Your doctor should file this report, or you can do it yourself through  the VAERS web site at www.vaers. [facility].gov, or by calling 7-876.609.9420. VAERS does not give medical advice. 6. The National Vaccine Injury Compensation Program 
 
The [facility] Vaccine Injury Compensation Program (VICP) is a federal program that was created to compensate people who may have been injured by certain vaccines. Persons who believe they may have been injured by a vaccine can learn about the program and about filing a claim by calling 1-271.540.7154 or visiting the [address] [place] [address] [address] website at www.[facility].gov/vaccinecompensation. There is a time limit to file a claim for compensation. 7. How can I learn more?  Ask your healthcare provider. He or she can give you the vaccine package insert or suggest other sources of information.  Call your local or state health department.  Contact the Centers for Disease Control and Prevention (CDC): 
- Call 1-500.949.6421 (1-800-CDC-INFO) or 
- Visit CDCs website at www.cdc.gov/flu Vaccine Information Statement Inactivated Influenza Vaccine 8/7/2015 
[address] TELLO[address] Martin Everardo 052AL-[id number] Department of [facility] and [employer] Centers for Disease Control and Prevention Office Use Only Learning About Lumbar Microdiscectomy What is a lumbar microdiscectomy? Microdiscectomy is surgery to remove part or all of a bulging (herniated) disc in the spine. A herniated disc may press on the spinal cord or spinal nerves. This can cause leg pain and numbness. Surgery may help the pain and numbness. It may also improve movement. And it will help prevent more damage. After surgery, your back may feel stiff and sore for several weeks. How is a lumbar microdiscectomy done? A doctor or nurse will give you medicine to make you sleep. You will not feel pain during the surgery. The doctor will make a 1- to 2-inch cut (incision) in the skin over the spine. He or she will put a special microscope (scope) and surgical tools through the incision. The doctor may first remove a small amount of bone and other tissue from the spine. This helps him or her see the area around the disc. Then the doctor removes the bulging part of the disc. Next, he or she closes the incision with stitches. After surgery, you will have a small scar on your back. It will fade with time. What can you expect after a lumbar microdiscectomy? You will probably be able to go home the same day as your surgery. After surgery, you may have less leg pain and numbness. And you may be able to move your leg better. Some people feel better very soon after surgery. But if you had leg pain or numbness for a long time before surgery, it may take longer to feel better. Your back will probably feel stiff and sore. It may be uncomfortable to sit or  one position for very long. This usually gets better after several weeks. But your back could be a little stiff for up to 6 months. Many people are able to go back to work and daily activities soon. If you work in an office, you may go back to work in 2 to 4 weeks. If your job requires physical labor (such as lifting or twisting) you may be able to go back to work in 4 to 8 weeks.  
Your doctor may recommend that you work with a physical therapist to make the muscles around your spine stronger and more flexible. You will need to learn how to lift, twist, and bend in ways that keep your back safe. Walking and doing back exercises at home can help you get better faster. Follow-up care is a key part of your treatment and safety. Be sure to make and go to all appointments, and call your doctor if you are having problems. It's also a good idea to know your test results and keep a list of the medicines you take. Where can you learn more? Go to http://anoop-adriana.info/. Enter E196 in the search box to learn more about \"Learning About Lumbar Microdiscectomy. \" Current as of: March 21, 2017 Content Version: 11.4 © 8717-1918 [employer]. Care instructions adapted under license by [employer] (which disclaims liability or warranty for this information). If you have questions about a medical condition or this instruction, always ask your healthcare professional. [place] [address] any warranty or liability for your use of this information. Deciding About Surgery for a Herniated Disc Deciding About Surgery for a Herniated Disc What kinds of surgery are done for a herniated disc? The most common surgeries are: · Discectomy. It takes out part of the disc that is pressing on a nerve root or on the spinal cord. It works best for people who still have bad pain (sciatica) after they have tried other treatments. · Percutaneous discectomy. It also takes out any part of the disc that is pressing on a nerve. But it is done with a special tool through a very small cut. · Laminotomy and laminectomy. They ease pressure caused by changes in the spine from aging. ¨ Laminotomy takes out some of the lamina. This is the thin part of the vertebrae that protects the spinal cord. ¨ Laminectomy takes out most of or all of the lamina. It also may take out tissue that is making the spinal canal too narrow. What are vásquez points about this decision? · Most people get better after a few months of treatment without surgery. This treatment includes rest, medicines, shots, and rehabilitation. · Surgery may be a good choice if you have severe pain, numbness, or weakness in your buttock and leg. This is called sciatica. It can keep you from being able to do your daily activities. · If you have medium to very bad pain, you will probably feel better sooner if you have surgery. But after 5 to 10 years, how well you can do your daily activities will probably be about the same whether you have surgery or not. · A doctor may advise you to have surgery. It's a good idea to get a second opinion. The more you know, the better your decision will be. · Surgery for a herniated disc does not often cause problems. But there is a slight risk of harming nerves or the spine during surgery. Or you could have problems from the anesthesia. There is also a chance that you could get an infection. Why might you choose surgery? · Surgery works well for many people with medium to very bad pain. · Surgery offers faster pain relief than other treatment. · With surgery, most people can go back to work sooner. · You have tried exercises and medicines for a few months. You do not think they have helped you. · You feel okay about the idea of having back surgery. Why might you choose not to have surgery? · Treatments without surgery work for most people. · Research shows that 10 years after treatment, people who did not have surgery are just about as likely to be able to do their daily activities as people who had surgery. · You do not like the thought of surgery. · Your pain is not bad enough that you need to have surgery right now. Your decision Thinking about the facts and your feelings can help you make a decision that is right for you.  Be sure you understand the benefits and risks of your options, and think about what else you need to do before you make the decision. Where can you learn more? Go to http://anoop-adriana.info/. Enter N977 in the search box to learn more about \"Deciding About Surgery for a Herniated Disc. \" Current as of: March 21, 2017 Content Version: 11.4 © 2495-4250 [employer]. Care instructions adapted under license by [employer] (which disclaims liability or warranty for this information). If you have questions about a medical condition or this instruction, always ask your healthcare professional. [place] [address] any warranty or liability for your use of this information. Herniated Disc: Care Instructions Your Care Instructions The bones that form the spine in your back are cushioned by small discs. If a disc is damaged, it may bulge or break open (herniate). A herniated disc can result from normal wear and tear as we age or from an injury or disease. If a herniated disc presses on a nerve, it can cause pain and numbness in your leg (sciatica) and/or back pain. You may be able to heal your herniated disc with a few weeks or months of rest, medicine, and exercises. In some cases, you may need surgery. Follow-up care is a key part of your treatment and safety. Be sure to make and go to all appointments, and call your doctor if you are having problems. It's also a good idea to know your test results and keep a list of the medicines you take. How can you care for yourself at home? · Take your medicines exactly as prescribed. Call your doctor if you think you are having a problem with your medicine. · Ask your doctor if you can take an over-the-counter pain medicine, such as acetaminophen (Tylenol), ibuprofen (Advil, Motrin), or naproxen (Aleve). Read and follow all instructions on the label.  
· Do not take two or more pain medicines at the same time unless the doctor told you to. Many pain medicines have acetaminophen, which is Tylenol. Too much acetaminophen (Tylenol) can be harmful. · Rest your back if your pain is severe. · Avoid movements and positions that increase your pain or numbness. · Try taking short walks and doing light activities that do not cause pain. Even if you are feeling some pain, it is important to keep your muscles active and strong. · Use heat or ice to relieve pain. ¨ To apply heat, put a warm water bottle, heating pad set on low, or warm cloth on your back. Do not go to sleep with a heating pad on your skin. ¨ To use ice, put ice or a cold pack on the area for 10 to 20 minutes at a time. Put a thin cloth between the ice and your skin. · Your doctor may recommend a physical therapy program, where you learn exercises to do at home. These exercises strengthen the muscles that support your lower back and prevent reinjury. · Stay at a healthy weight. This may reduce the load on your back. · Quit smoking if you smoke. If you need help quitting, talk to your doctor about stop-smoking programs and medicines. These can increase your chances of quitting for good. · To avoid hurting your back when lifting: ¨ Lift with your legs, not your back, by squatting and bending your knees. Avoid bending forward at the waist when lifting. ¨ Rise slowly. ¨ Keep the load as close to your body as possible, at the level of your navel. ¨ Avoid turning or twisting your body while holding a heavy object. ¨ Get help if you need to lift a heavy object. Never lift a heavy object above shoulder level. When should you call for help? Call 911 anytime you think you may need emergency care. For example, call if: 
? · You are unable to move a leg at all. ?Call your doctor now or seek immediate medical care if: 
? · You have new or worse symptoms in your arms, legs, chest, belly, or buttocks. Symptoms may include: ¨ Numbness or tingling. ¨ Weakness. ¨ Pain. ? · You lose bladder or bowel control. ? Watch closely for changes in your health, and be sure to contact your doctor if: 
? · You are not getting better as expected. Where can you learn more? Go to http://anoop-adriana.info/. Enter F534 in the search box to learn more about \"Herniated Disc: Care Instructions. \" Current as of: March 21, 2017 Content Version: 11.4 © 2850-5341 [employer]. Care instructions adapted under license by [employer] (which disclaims liability or warranty for this information). If you have questions about a medical condition or this instruction, always ask your healthcare professional. [address] any warranty or liability for your use of this information. Introducing \[facility]\"" & HEALTH SERVICES! Romayne Duster introduces RegaloCard patient portal. Now you can access parts of your medical record, email your doctor's office, and request medication refills online. 1. In your internet browser, go to [email or web address] 2. Click on the First Time User? Click Here link in the Sign In box. You will see the New Member Sign Up page. 3. Enter your RegaloCard Access Code exactly as it appears below. You will not need to use this code after youve completed the sign-up process. If you do not sign up before the expiration date, you must request a new code. · RegaloCard Access Code: [email or web address] Expires: 3/21/2018  4:22 PM 
 
4. Enter the last four digits of your Social Security Number (xxxx) and Date of Birth (mm/dd/yyyy) as indicated and click Submit. You will be taken to the next sign-up page. 5. Create a [employer] ID. This will be your RegaloCard login ID and cannot be changed, so think of one that is secure and easy to remember. 6. Create a RegaloCard password. You can change your password at any time. 7. Enter your Password Reset Question and Answer. This can be used at a later time if you forget your password. 8. Enter your e-mail address. You will receive e-mail notification when new information is available in [address] 9. Click Sign Up. You can now view and download portions of your medical record. 10. Click the Download Summary menu link to download a portable copy of your medical information. If you have questions, please visit the Frequently Asked Questions section of the [employer] website. Remember, [employer] is NOT to be used for urgent needs. For medical emergencies, dial 911. Now available from your iPhone and Android! Please provide this summary of care documentation to your next provider. Your primary care clinician is listed as Brook Larios. If you have any questions after today's visit, please call [phone].

## 2017-12-21 NOTE — PROGRESS NOTES
Today's Date:  2017   Patient:  Oscar Navarrete  Patient :  1980    Subjective:   Oscar Navarrete is a 44 y.o. male who presents for follow up back pain and   Left leg pain with tingling which runs down his left leg. He is requesting a referral back to neurology to discuss possibly diskectomy. He was started on Topamax which have been  titrated to 75 mg . He states it is starting to kick in. He has no other complaints today. Current Outpatient Meds and Allergies     Current Outpatient Prescriptions on File Prior to Visit   Medication Sig Dispense Refill    methocarbamol (ROBAXIN) 500 mg tablet Take 1 po tid prn spasms 45 Tab 0    omeprazole (PRILOSEC) 20 mg capsule Take 20 mg by mouth daily.  therapeutic multivitamin-minerals (VITAMINS AND MINERALS) tablet Take 1 Tab by mouth.  fish oil-omega-3 fatty acids (FISH OIL) 340-1,000 mg capsule Take 1,000 mg by mouth daily.  sertraline (ZOLOFT) 25 mg tablet Take 25 mg by mouth daily.  topiramate (TOPAMAX) 25 mg tablet Take 1 in the evening for 1 week, then increase to 2 the second week and continue with 3 in the evening 90 Tab 1    methylPREDNISolone (MEDROL DOSEPACK) 4 mg tablet Per dose pack instructions 1 Dose Pack 0    metaxalone (SKELAXIN) 800 mg tablet Take 1 tab by mouth BID-TID PRN  Indications: Muscle Spasm 60 Tab 2    diclofenac EC (VOLTAREN) 75 mg EC tablet Take 75 mg by mouth two (2) times a day.  baclofen (LIORESAL) 10 mg tablet Take 1 Tab by mouth three (3) times daily. 90 Tab 0    HYDROcodone-acetaminophen (NORCO) 5-325 mg per tablet Take 1-2 tablets PO every 4-6 hours as needed for pain control. If over the counter ibuprofen or acetaminophen was suggested, then only take the vicodin for pain not well controlled with the over the counter medication. 12 Tab 0     No current facility-administered medications on file prior to visit.       These medications have been reviewed and reconciled with the patient during today's visit. Allergies   Allergen Reactions    Codeine Other (comments)     Intolerance           ROS:     CONST:   Denies fatigue, weight change, appetite change   NEURO:   Denies headaches, vision changes, dizziness, loss of consciousness  CV:      Denies chest pain, palpitations, orthopnea, PND  PULM:  Denies SOB, wheezing, cough, hemoptysis  GI:             Denies nausea, vomiting, abdominal pain, greasy stools, blood in stool,     diarrhea, constipation  :       Denies dysuria, hematuria, change in urine  MS:      Denies  joint swelling. +muscle/joint pain of lower back and tingling down left leg. SKIN:        Denies rashes, skin changes  ALLERGY: Denies seasonal allergies, itchy eyes  HEME: Denies easy bleeding/bruising    Objective:     VS:    Visit Vitals    /70 (BP 1 Location: Left arm, BP Patient Position: Sitting)    Pulse 88    Temp 98.9 °F (37.2 °C) (Oral)    Resp 15    Ht 5' 6\" (1.676 m)    Wt 236 lb 3.2 oz (107.1 kg)    SpO2 96%    BMI 38.12 kg/m2       General:   Well-nourished, well-groomed, pleasant, alert, in no acute distress. Head:  Normocephalic, atraumatic, MMM,   Ears:  External ears WNL, TMs WNL,   Eyes:  EOMI, PERRL,   Nose:  External nares WNL  Neck:  Neck supple with normal ROM for age, no thyromegaly,  Cardiovasc:   Regular rate and rhythm, no murmurs, no rubs, no gallops,   Pulmonary:   Clear breath sounds bilaterally, good air movement, no wheezing, no rales, no rhonchi, normal respiratory effort  Abdomen:   Abdomen soft, nontender, nondistended, NABS,  Extremities:   No edema, no tenderness with palpation of calves, warm and well-perfused  Neuro:   Alert, conversant, appropriate, following commands, no focal deficits. Pertinent diagnostic procedures include:  No results found for this or any previous visit (from the past 24 hour(s)). Assessment:       1. Encounter for immunization    2.  Herniated nucleus pulposus of lumbosacral region        Plan: Orders Placed This Encounter    Influenza virus vaccine (QUADRIVALENT PRES FREE SYRINGE) IM ([id number])    REFERRAL TO NEUROLOGY     Referral Priority:   Routine     Referral Type:   Consultation     Referral Reason:   Specialty Services Required     Requested Specialty:   Neurology    topiramate (TOPAMAX) 50 mg tablet     Sig: Take 50 mg by mouth daily. Follow up in 4 weeks or prn    I have discussed the diagnosis with the patient and the intended plan as seen in the above orders. The patient has received an after-visit summary along with patient information handout. Pt verbalized understanding.       Valentino Brown. [address]  [address] [facility] [place], [address]  Phone [phone]  Fax [phone]

## 2017-12-21 NOTE — PATIENT INSTRUCTIONS
Vaccine Information Statement    Influenza (Flu) Vaccine (Inactivated or Recombinant): What you need to know    Many Vaccine Information Statements are available in Slovak and other languages. See www.immunize.org/vis  Hojas de Información Sobre Vacunas están disponibles en Español y en muchos otros idiomas. Visite www.immunize.org/vis    1. Why get vaccinated? Influenza (flu) is a contagious disease that spreads around the [place] every year, usually between October and May. Flu is caused by influenza viruses, and is spread mainly by coughing, sneezing, and close contact. Anyone can get flu. Flu strikes suddenly and can last several days. Symptoms vary by age, but can include:   fever/chills   sore throat   muscle aches   fatigue   cough   headache    runny or stuffy nose    Flu can also lead to pneumonia and blood infections, and cause diarrhea and seizures in children. If you have a medical condition, such as heart or lung disease, flu can make it worse. Flu is more dangerous for some people. Infants and young children, people 72years of age and older, pregnant women, and people with certain health conditions or a weakened immune system are at greatest risk. Each year thousands of people in the [facility] die from flu, and many more are hospitalized. Flu vaccine can:   keep you from getting flu,   make flu less severe if you do get it, and   keep you from spreading flu to your family and other people. 2. Inactivated and recombinant flu vaccines    A dose of flu vaccine is recommended every flu season. Children 6 months through 6years of age may need two doses during the same flu season. Everyone else needs only one dose each flu season.        Some inactivated flu vaccines contain a very small amount of a mercury-based preservative called thimerosal. Studies have not shown thimerosal in vaccines to be harmful, but flu vaccines that do not contain thimerosal are available. There is no live flu virus in flu shots. They cannot cause the flu. There are many flu viruses, and they are always changing. Each year a new flu vaccine is made to protect against three or four viruses that are likely to cause disease in the upcoming flu season. But even when the vaccine doesnt exactly match these viruses, it may still provide some protection    Flu vaccine cannot prevent:   flu that is caused by a virus not covered by the vaccine, or   illnesses that look like flu but are not. It takes about 2 weeks for protection to develop after vaccination, and protection lasts through the flu season. 3. Some people should not get this vaccine    Tell the person who is giving you the vaccine:     If you have any severe, life-threatening allergies. If you ever had a life-threatening allergic reaction after a dose of flu vaccine, or have a severe allergy to any part of this vaccine, you may be advised not to get vaccinated. Most, but not all, types of flu vaccine contain a small amount of egg protein.  If you ever had Guillain-Barré Syndrome (also called GBS). Some people with a history of GBS should not get this vaccine. This should be discussed with your doctor.  If you are not feeling well. It is usually okay to get flu vaccine when you have a mild illness, but you might be asked to come back when you feel better. 4. Risks of a vaccine reaction    With any medicine, including vaccines, there is a chance of reactions. These are usually mild and go away on their own, but serious reactions are also possible. Most people who get a flu shot do not have any problems with it.      Minor problems following a flu shot include:    soreness, redness, or swelling where the shot was given     hoarseness   sore, red or itchy eyes   cough   fever   aches   headache   itching   fatigue  If these problems occur, they usually begin soon after the shot and last 1 or 2 days. More serious problems following a flu shot can include the following:     There may be a small increased risk of Guillain-Barré Syndrome (GBS) after inactivated flu vaccine. This risk has been estimated at 1 or 2 additional cases per million people vaccinated. This is much lower than the risk of severe complications from flu, which can be prevented by flu vaccine.  Young children who get the flu shot along with pneumococcal vaccine (PCV13) and/or DTaP vaccine at the same time might be slightly more likely to have a seizure caused by fever. Ask your doctor for more information. Tell your doctor if a child who is getting flu vaccine has ever had a seizure. Problems that could happen after any injected vaccine:      People sometimes faint after a medical procedure, including vaccination. Sitting or lying down for about 15 minutes can help prevent fainting, and injuries caused by a fall. Tell your doctor if you feel dizzy, or have vision changes or ringing in the ears.  Some people get severe pain in the shoulder and have difficulty moving the arm where a shot was given. This happens very rarely.  Any medication can cause a severe allergic reaction. Such reactions from a vaccine are very rare, estimated at about 1 in a million doses, and would happen within a few minutes to a few hours after the vaccination. As with any medicine, there is a very remote chance of a vaccine causing a serious injury or death. The safety of vaccines is always being monitored. For more information, visit: www.cdc.gov/vaccinesafety/    5. What if there is a serious reaction? What should I look for?  Look for anything that concerns you, such as signs of a severe allergic reaction, very high fever, or unusual behavior.     Signs of a severe allergic reaction can include hives, swelling of the face and throat, difficulty breathing, a fast heartbeat, dizziness, and weakness - usually within a few minutes to a few hours after the vaccination. What should I do?  If you think it is a severe allergic reaction or other emergency that cant wait, call 9-1-1 and get the person to the nearest hospital. Otherwise, call your doctor.  Reactions should be reported to the Vaccine Adverse Event Reporting System (VAERS). Your doctor should file this report, or you can do it yourself through  the VAERS web site at www.vaers. [facility].gov, or by calling 8-426.811.9187. VAERS does not give medical advice. 6. The National Vaccine Injury Compensation Program    The [facility], [facility] within [facility] Vaccine Injury Compensation Program (VICP) is a federal program that was created to compensate people who may have been injured by certain vaccines. Persons who believe they may have been injured by a vaccine can learn about the program and about filing a claim by calling 0-521.250.4640 or visiting the [employer] website at www.[facility].gov/vaccinecompensation. There is a time limit to file a claim for compensation. 7. How can I learn more?  Ask your healthcare provider. He or she can give you the vaccine package insert or suggest other sources of information.  Call your local or state health department.  Contact the Centers for Disease Control and Prevention (CDC):  - Call 1-305.712.3031 (1-800-CDC-INFO) or  - Visit CDCs website at www.cdc.gov/flu    Vaccine Information Statement   Inactivated Influenza Vaccine   8/7/2015  [id number]    Department of Health and Human Services  Centers for Disease Control and Prevention    Office Use Only       Learning About Lumbar Microdiscectomy  What is a lumbar microdiscectomy? Microdiscectomy is surgery to remove part or all of a bulging (herniated) disc in the spine. A herniated disc may press on the spinal cord or spinal nerves. This can cause leg pain and numbness. Surgery may help the pain and numbness. It may also improve movement. And it will help prevent more damage.   After surgery, your back may feel stiff and sore for several weeks. How is a lumbar microdiscectomy done? A doctor or nurse will give you medicine to make you sleep. You will not feel pain during the surgery. The doctor will make a 1- to 2-inch cut (incision) in the skin over the spine. He or she will put a special microscope (scope) and surgical tools through the incision. The doctor may first remove a small amount of bone and other tissue from the spine. This helps him or her see the area around the disc. Then the doctor removes the bulging part of the disc. Next, he or she closes the incision with stitches. After surgery, you will have a small scar on your back. It will fade with time. What can you expect after a lumbar microdiscectomy? You will probably be able to go home the same day as your surgery. After surgery, you may have less leg pain and numbness. And you may be able to move your leg better. Some people feel better very soon after surgery. But if you had leg pain or numbness for a long time before surgery, it may take longer to feel better. Your back will probably feel stiff and sore. It may be uncomfortable to sit or  one position for very long. This usually gets better after several weeks. But your back could be a little stiff for up to 6 months. Many people are able to go back to work and daily activities soon. If you work in an office, you may go back to work in 2 to 4 weeks. If your job requires physical labor (such as lifting or twisting) you may be able to go back to work in 4 to 8 weeks. Your doctor may recommend that you work with a physical therapist to make the muscles around your spine stronger and more flexible. You will need to learn how to lift, twist, and bend in ways that keep your back safe. Walking and doing back exercises at home can help you get better faster. Follow-up care is a key part of your treatment and safety.  Be sure to make and go to all appointments, and call your doctor if you are having problems. It's also a good idea to know your test results and keep a list of the medicines you take. Where can you learn more? Go to http://anoop-adriana.info/. Enter R814 in the search box to learn more about \"Learning About Lumbar Microdiscectomy. \"  Current as of: March 21, 2017  Content Version: 11.4  © 2006-2017 [employer]. Care instructions adapted under license by [employer] (which disclaims liability or warranty for this information). If you have questions about a medical condition or this instruction, always ask your healthcare professional. [place] [address] any warranty or liability for your use of this information. Deciding About Surgery for a Herniated Disc  Deciding About Surgery for a Herniated Disc  What kinds of surgery are done for a herniated disc? The most common surgeries are:  · Discectomy. It takes out part of the disc that is pressing on a nerve root or on the spinal cord. It works best for people who still have bad pain (sciatica) after they have tried other treatments. · Percutaneous discectomy. It also takes out any part of the disc that is pressing on a nerve. But it is done with a special tool through a very small cut. · Laminotomy and laminectomy. They ease pressure caused by changes in the spine from aging. ¨ Laminotomy takes out some of the lamina. This is the thin part of the vertebrae that protects the spinal cord. ¨ Laminectomy takes out most of or all of the lamina. It also may take out tissue that is making the spinal canal too narrow. What are key points about this decision? · Most people get better after a few months of treatment without surgery. This treatment includes rest, medicines, shots, and rehabilitation. · Surgery may be a good choice if you have severe pain, numbness, or weakness in your buttock and leg. This is called sciatica.  It can keep you from being able to do your daily activities. · If you have medium to very bad pain, you will probably feel better sooner if you have surgery. But after 5 to 10 years, how well you can do your daily activities will probably be about the same whether you have surgery or not. · A doctor may advise you to have surgery. It's a good idea to get a second opinion. The more you know, the better your decision will be. · Surgery for a herniated disc does not often cause problems. But there is a slight risk of harming nerves or the spine during surgery. Or you could have problems from the anesthesia. There is also a chance that you could get an infection. Why might you choose surgery? · Surgery works well for many people with medium to very bad pain. · Surgery offers faster pain relief than other treatment. · With surgery, most people can go back to work sooner. · You have tried exercises and medicines for a few months. You do not think they have helped you. · You feel okay about the idea of having back surgery. Why might you choose not to have surgery? · Treatments without surgery work for most people. · Research shows that 10 years after treatment, people who did not have surgery are just about as likely to be able to do their daily activities as people who had surgery. · You do not like the thought of surgery. · Your pain is not bad enough that you need to have surgery right now. Your decision  Thinking about the facts and your feelings can help you make a decision that is right for you. Be sure you understand the benefits and risks of your options, and think about what else you need to do before you make the decision. Where can you learn more? Go to http://anoop-adriana.info/. Enter N175 in the search box to learn more about \"Deciding About Surgery for a Herniated Disc. \"  Current as of: March 21, 2017  Content Version: 11.4  © 6240-5102 Healthwise[employer].  Care instructions adapted under license by [address] (which disclaims liability or warranty for this information). If you have questions about a medical condition or this instruction, always ask your healthcare professional. [address] any warranty or liability for your use of this information. Herniated Disc: Care Instructions  Your Care Instructions    The bones that form the spine in your back are cushioned by small discs. If a disc is damaged, it may bulge or break open (herniate). A herniated disc can result from normal wear and tear as we age or from an injury or disease. If a herniated disc presses on a nerve, it can cause pain and numbness in your leg (sciatica) and/or back pain. You may be able to heal your herniated disc with a few weeks or months of rest, medicine, and exercises. In some cases, you may need surgery. Follow-up care is a key part of your treatment and safety. Be sure to make and go to all appointments, and call your doctor if you are having problems. It's also a good idea to know your test results and keep a list of the medicines you take. How can you care for yourself at home? · Take your medicines exactly as prescribed. Call your doctor if you think you are having a problem with your medicine. · Ask your doctor if you can take an over-the-counter pain medicine, such as acetaminophen (Tylenol), ibuprofen (Advil, Motrin), or naproxen (Aleve). Read and follow all instructions on the label. · Do not take two or more pain medicines at the same time unless the doctor told you to. Many pain medicines have acetaminophen, which is Tylenol. Too much acetaminophen (Tylenol) can be harmful. · Rest your back if your pain is severe. · Avoid movements and positions that increase your pain or numbness. · Try taking short walks and doing light activities that do not cause pain. Even if you are feeling some pain, it is important to keep your muscles active and strong.   · Use heat or ice to relieve pain. ¨ To apply heat, put a warm water bottle, heating pad set on low, or warm cloth on your back. Do not go to sleep with a heating pad on your skin. ¨ To use ice, put ice or a cold pack on the area for 10 to 20 minutes at a time. Put a thin cloth between the ice and your skin. · Your doctor may recommend a physical therapy program, where you learn exercises to do at home. These exercises strengthen the muscles that support your lower back and prevent reinjury. · Stay at a healthy weight. This may reduce the load on your back. · Quit smoking if you smoke. If you need help quitting, talk to your doctor about stop-smoking programs and medicines. These can increase your chances of quitting for good. · To avoid hurting your back when lifting:  ¨ Lift with your legs, not your back, by squatting and bending your knees. Avoid bending forward at the waist when lifting. ¨ Rise slowly. ¨ Keep the load as close to your body as possible, at the level of your navel. ¨ Avoid turning or twisting your body while holding a heavy object. ¨ Get help if you need to lift a heavy object. Never lift a heavy object above shoulder level. When should you call for help? Call 911 anytime you think you may need emergency care. For example, call if:  ? · You are unable to move a leg at all. ?Call your doctor now or seek immediate medical care if:  ? · You have new or worse symptoms in your arms, legs, chest, belly, or buttocks. Symptoms may include:  ¨ Numbness or tingling. ¨ Weakness. ¨ Pain. ? · You lose bladder or bowel control. ? Watch closely for changes in your health, and be sure to contact your doctor if:  ? · You are not getting better as expected. Where can you learn more? Go to http://anoop-adriana.info/. Enter F534 in the search box to learn more about \"Herniated Disc: Care Instructions. \"  Current as of: March 21, 2017  Content Version: 11.4  © 8597-2137 Healthwise, Incorporated. Care instructions adapted under license by [employer] (which disclaims liability or warranty for this information). If you have questions about a medical condition or this instruction, always ask your healthcare professional. [address] any warranty or liability for your use of this information.

## 2017-12-22 ENCOUNTER — HOSPITAL ENCOUNTER (OUTPATIENT)
Dept: LAB | Age: 37
Discharge: HOME | End: 2017-12-22
Payer: OTHER GOVERNMENT

## 2017-12-22 DIAGNOSIS — E78.89 LIPIDS ABNORMAL: ICD-10-CM

## 2017-12-22 LAB
CHOLEST SERPL-MCNC: 249 MG/DL
HDLC SERPL-MCNC: 28 MG/DL (ref 40–60)
HDLC SERPL: 8.9 {RATIO} (ref 0–5)
LDLC SERPL CALC-MCNC: ABNORMAL MG/DL (ref 0–100)
LIPID PROFILE,FLP: ABNORMAL
TRIGL SERPL-MCNC: 487 MG/DL (ref ?–150)
VLDLC SERPL CALC-MCNC: ABNORMAL MG/DL

## 2017-12-22 PROCEDURE — 36415 COLL VENOUS BLD VENIPUNCTURE: CPT | Performed by: NURSE PRACTITIONER

## 2017-12-22 PROCEDURE — 80061 LIPID PANEL: CPT | Performed by: NURSE PRACTITIONER

## 2017-12-27 ENCOUNTER — TELEPHONE (OUTPATIENT)
Dept: FAMILY MEDICINE CLINIC | Facility: CLINIC | Age: 37
End: 2017-12-27

## 2017-12-27 NOTE — PROGRESS NOTES
Your tryglyceride is  Very high and your HDL is very low- we need to verify that you was fasting for at least 8 hours before you had your labs drawn.

## 2017-12-27 NOTE — TELEPHONE ENCOUNTER
Your tryglyceride is  Very high and your HDL is very low- we need to verify that you was fasting for at least 8 hours before you had your labs drawn. Spoke with pt in regards to results. Pt acknowledges understanding and voices no concerns at this time. Patient said he was fasting for blood work,and he is watching what he is eating. He really can not exercise because patient is having back surgery soon.

## 2018-01-08 ENCOUNTER — OFFICE VISIT (OUTPATIENT)
Dept: FAMILY MEDICINE CLINIC | Facility: CLINIC | Age: 38
End: 2018-01-08

## 2018-01-08 VITALS
WEIGHT: 238 LBS | OXYGEN SATURATION: 98 % | HEIGHT: 66 IN | BODY MASS INDEX: 38.25 KG/M2 | HEART RATE: 112 BPM | TEMPERATURE: 98.7 F | DIASTOLIC BLOOD PRESSURE: 94 MMHG | RESPIRATION RATE: 15 BRPM | SYSTOLIC BLOOD PRESSURE: 120 MMHG

## 2018-01-08 DIAGNOSIS — R09.82 POSTNASAL DRIP: ICD-10-CM

## 2018-01-08 DIAGNOSIS — J02.9 SORE THROAT: Primary | ICD-10-CM

## 2018-01-08 LAB
S PYO AG THROAT QL: NEGATIVE
VALID INTERNAL CONTROL?: YES

## 2018-01-08 RX ORDER — NAPROXEN SODIUM 220 MG
220 TABLET ORAL 2 TIMES DAILY WITH MEALS
COMMUNITY

## 2018-01-08 NOTE — MR AVS SNAPSHOT
Visit Information Date & Time Provider Department Dept. Phone Encounter #  
 1/8/2018  2:30 PM Apollo Lau  Teddy Flores [phone] [id number] Follow-up Instructions Return if symptoms worsen or fail to improve. Your Appointments 1/18/2018  3:00 PM  
Follow Up with Beatrix Severin,  Teddy Flores ([facility]) Appt Note: Return in about 1 month (around 1/21/2018), or if symptoms worsen or fail to improve [address] [place] [address] [phone]  
[address] [facility] [address] Upcoming Health Maintenance Date Due DTaP/Tdap/Td series (2 - Td) 12/21/2027 Allergies as of 1/8/2018  Review Complete On: 1/8/2018 By: Douglas Wilkerson Severity Noted Reaction Type Reactions Codeine  09/13/2017    Other (comments) Intolerance Current Immunizations  Reviewed on 12/21/2017 Name Date Influenza Vaccine (Quad) PF 12/21/2017  4:00 PM  
  
 Not reviewed this visit You Were Diagnosed With   
  
 Codes Comments Sore throat    -  Primary ICD-10-CM: J02.9 ICD-9-CM: 897 Postnasal drip     ICD-10-CM: R09.82 ICD-9-CM: 784.91 Vitals BP Pulse Temp Resp Height(growth percentile) Weight(growth percentile) (!) 120/94 (BP 1 Location: Left arm, BP Patient Position: Sitting) (!) 112 98.7 °F (37.1 °C) (Oral) 15 5' 6\" (1.676 m) 238 lb (108 kg) SpO2 BMI Smoking Status 98% 38.41 kg/m2 Never Smoker Vitals History BMI and BSA Data Body Mass Index Body Surface Area  
 38.41 kg/m 2 2.24 m 2 Preferred Pharmacy Pharmacy Name Phone [address], [address] [phone] Your Updated Medication List  
  
   
This list is accurate as of: 1/8/18  3:17 PM.  Always use your most recent med list.  
  
  
  
  
 ALEVE 220 mg tablet Generic drug:  naproxen sodium Take 220 mg by mouth two (2) times daily (with meals). baclofen 10 mg tablet Commonly known as:  LIORESAL Take 1 Tab by mouth three (3) times daily. diclofenac EC 75 mg EC tablet Commonly known as:  VOLTAREN Take 75 mg by mouth two (2) times a day. FISH -1,000 mg capsule Generic drug:  fish oil-omega-3 fatty acids Take 1,000 mg by mouth two (2) times a day. HYDROcodone-acetaminophen 5-325 mg per tablet Commonly known as:  Phoenix Silvius Take 1-2 tablets PO every 4-6 hours as needed for pain control. If over the counter ibuprofen or acetaminophen was suggested, then only take the vicodin for pain not well controlled with the over the counter medication. metaxalone 800 mg tablet Commonly known as:  SKELAXIN Take 1 tab by mouth BID-TID PRN  Indications: Muscle Spasm  
  
 methocarbamol 500 mg tablet Commonly known as:  ROBAXIN Take 1 po tid prn spasms  
  
 methylPREDNISolone 4 mg tablet Commonly known as:  Jeff Lui Per dose pack instructions  
  
 omeprazole 20 mg capsule Commonly known as:  PRILOSEC Take 20 mg by mouth daily. sertraline 25 mg tablet Commonly known as:  ZOLOFT Take 25 mg by mouth daily. * TOPAMAX 50 mg tablet Generic drug:  topiramate Take 50 mg by mouth daily. * topiramate 25 mg tablet Commonly known as:  TOPAMAX Take 1 in the evening for 1 week, then increase to 2 the second week and continue with 3 in the evening VITAMINS AND MINERALS tablet Generic drug:  therapeutic multivitamin-minerals Take 1 Tab by mouth. * Notice: This list has 2 medication(s) that are the same as other medications prescribed for you. Read the directions carefully, and ask your doctor or other care provider to review them with you. We Performed the Following AMB POC RAPID STREP A [47609 CPT(R)] Follow-up Instructions Return if symptoms worsen or fail to improve. Patient Instructions Sore Throat: Care Instructions Your Care Instructions Infection by bacteria or a virus causes most sore throats. Cigarette smoke, dry air, air pollution, allergies, and yelling can also cause a sore throat. Sore throats can be painful and annoying. Fortunately, most sore throats go away on their own. If you have a bacterial infection, your doctor may prescribe antibiotics. Follow-up care is a key part of your treatment and safety. Be sure to make and go to all appointments, and call your doctor if you are having problems. It's also a good idea to know your test results and keep a list of the medicines you take. How can you care for yourself at home? · If your doctor prescribed antibiotics, take them as directed. Do not stop taking them just because you feel better. You need to take the full course of antibiotics. · Gargle with warm salt water once an hour to help reduce swelling and relieve discomfort. Use 1 teaspoon of salt mixed in 1 cup of warm water. · Take an over-the-counter pain medicine, such as acetaminophen (Tylenol), ibuprofen (Advil, Motrin), or naproxen (Aleve). Read and follow all instructions on the label. · Be careful when taking over-the-counter cold or flu medicines and Tylenol at the same time. Many of these medicines have acetaminophen, which is Tylenol. Read the labels to make sure that you are not taking more than the recommended dose. Too much acetaminophen (Tylenol) can be harmful. · Drink plenty of fluids. Fluids may help soothe an irritated throat. Hot fluids, such as tea or soup, may help decrease throat pain. · Use over-the-counter throat lozenges to soothe pain. Regular cough drops or hard candy may also help. These should not be given to young children because of the risk of choking. · Do not smoke or allow others to smoke around you. If you need help quitting, talk to your doctor about stop-smoking programs and medicines. These can increase your chances of quitting for good. · Use a vaporizer or humidifier to add moisture to your bedroom. Follow the directions for cleaning the machine. When should you call for help? Call your doctor now or seek immediate medical care if: 
? · You have new or worse trouble swallowing. ? · Your sore throat gets much worse on one side. ? Watch closely for changes in your health, and be sure to contact your doctor if you do not get better as expected. Where can you learn more? Go to http://anoop-adriana.info/. Enter [phone] in the search box to learn more about \"Sore Throat: Care Instructions. \" Current as of: May 12, 2017 Content Version: 11.4 © 3548-9369 Healthwise, Incorporated. Care instructions adapted under license by [employer] (which disclaims liability or warranty for this information). If you have questions about a medical condition or this instruction, always ask your healthcare professional. [place] [address] any warranty or liability for your use of this information. Introducing [facility] & HEALTH [facility]! [facility] introduces TeraView patient portal. Now you can access parts of your medical record, email your doctor's office, and request medication refills online. 1. In your internet browser, go to [email or web address] 2. Click on the First Time User? Click Here link in the Sign In box. You will see the New Member Sign Up page. 3. Enter your TeraView Access Code exactly as it appears below. You will not need to use this code after youve completed the sign-up process. If you do not sign up before the expiration date, you must request a new code. · TeraView Access Code: [email or web address] Expires: 3/21/2018  4:22 PM 
 
4. Enter the last four digits of your Social Security Number (xxxx) and Date of Birth (mm/dd/yyyy) as indicated and click Submit. You will be taken to the next sign-up page. 5. Create a [employer] ID. This will be your [employer] login ID and cannot be changed, so think of one that is secure and easy to remember. 6. Create a [employer] password. You can change your password at any time. 7. Enter your Password Reset Question and Answer. This can be used at a later time if you forget your password. 8. Enter your e-mail address. You will receive e-mail notification when new information is available in [address] 9. Click Sign Up. You can now view and download portions of your medical record. 10. Click the Download Summary menu link to download a portable copy of your medical information. If you have questions, please visit the Frequently Asked Questions section of the [employer] website. Remember, [employer] is NOT to be used for urgent needs. For medical emergencies, dial 911. Now available from your iPhone and Android! Please provide this summary of care documentation to your next provider. Your primary care clinician is listed as Elizabet Zapata. If you have any questions after today's visit, please call [phone].
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
## 2018-01-08 NOTE — PATIENT INSTRUCTIONS
Sore Throat: Care Instructions  Your Care Instructions    Infection by bacteria or a virus causes most sore throats. Cigarette smoke, dry air, air pollution, allergies, and yelling can also cause a sore throat. Sore throats can be painful and annoying. Fortunately, most sore throats go away on their own. If you have a bacterial infection, your doctor may prescribe antibiotics. Follow-up care is a key part of your treatment and safety. Be sure to make and go to all appointments, and call your doctor if you are having problems. It's also a good idea to know your test results and keep a list of the medicines you take. How can you care for yourself at home? · If your doctor prescribed antibiotics, take them as directed. Do not stop taking them just because you feel better. You need to take the full course of antibiotics. · Gargle with warm salt water once an hour to help reduce swelling and relieve discomfort. Use 1 teaspoon of salt mixed in 1 cup of warm water. · Take an over-the-counter pain medicine, such as acetaminophen (Tylenol), ibuprofen (Advil, Motrin), or naproxen (Aleve). Read and follow all instructions on the label. · Be careful when taking over-the-counter cold or flu medicines and Tylenol at the same time. Many of these medicines have acetaminophen, which is Tylenol. Read the labels to make sure that you are not taking more than the recommended dose. Too much acetaminophen (Tylenol) can be harmful. · Drink plenty of fluids. Fluids may help soothe an irritated throat. Hot fluids, such as tea or soup, may help decrease throat pain. · Use over-the-counter throat lozenges to soothe pain. Regular cough drops or hard candy may also help. These should not be given to young children because of the risk of choking. · Do not smoke or allow others to smoke around you. If you need help quitting, talk to your doctor about stop-smoking programs and medicines.  These can increase your chances of quitting for good. · Use a vaporizer or humidifier to add moisture to your bedroom. Follow the directions for cleaning the machine. When should you call for help? Call your doctor now or seek immediate medical care if:  ? · You have new or worse trouble swallowing. ? · Your sore throat gets much worse on one side. ? Watch closely for changes in your health, and be sure to contact your doctor if you do not get better as expected. Where can you learn more? Go to http://anoop-adriana.info/. Enter [phone] in the search box to learn more about \"Sore Throat: Care Instructions. \"  Current as of: May 12, 2017  Content Version: 11.4  © 2158-6871 Healthwise, Incorporated. Care instructions adapted under license by [employer] (which disclaims liability or warranty for this information). If you have questions about a medical condition or this instruction, always ask your healthcare professional. [address] any warranty or liability for your use of this information.

## 2018-01-08 NOTE — PROGRESS NOTES
Winnie Tsang is a 44 y.o.  male presents today for same day sick visit for cold symptoms for two weeks. Pt is in Room # 4.      1. Have you been to the ER, urgent care clinic since your last visit? Hospitalized since your last visit? No    2. Have you seen or consulted any other health care providers outside of the [facility] since your last visit? Include any pap smears or colon screening. No    Health Maintenance UTD.       Upcoming Appts  PCP on 1/18/18    VORB: No orders of the defined types were placed in this encounter.    MD Mukund Dennis LPN

## 2018-01-18 ENCOUNTER — OFFICE VISIT (OUTPATIENT)
Dept: FAMILY MEDICINE CLINIC | Facility: CLINIC | Age: 38
End: 2018-01-18

## 2018-01-18 VITALS
SYSTOLIC BLOOD PRESSURE: 107 MMHG | HEIGHT: 66 IN | DIASTOLIC BLOOD PRESSURE: 80 MMHG | WEIGHT: 237.2 LBS | BODY MASS INDEX: 38.12 KG/M2 | RESPIRATION RATE: 15 BRPM | TEMPERATURE: 99 F | HEART RATE: 86 BPM | OXYGEN SATURATION: 93 %

## 2018-01-18 DIAGNOSIS — M51.26 LUMBAR HERNIATED DISC: Primary | ICD-10-CM

## 2018-01-18 RX ORDER — DICLOFENAC SODIUM 10 MG/G
1 GEL TOPICAL 4 TIMES DAILY
Qty: 1 EACH | Refills: 3 | Status: SHIPPED | OUTPATIENT
Start: 2018-01-18

## 2018-01-18 RX ORDER — LIDOCAINE 50 MG/G
1 PATCH TOPICAL EVERY 24 HOURS
Qty: 15 EACH | Refills: 3 | Status: SHIPPED | OUTPATIENT
Start: 2018-01-18

## 2018-01-18 RX ORDER — DICLOFENAC SODIUM 10 MG/G
1 GEL TOPICAL 4 TIMES DAILY
Qty: 1 EACH | Refills: 3 | Status: SHIPPED | OUTPATIENT
Start: 2018-01-18 | End: 2018-01-18 | Stop reason: SDUPTHER

## 2018-01-18 NOTE — PROGRESS NOTES
Today's Date:  2018   Patient:  Shawn Muhammad  Patient :  1980    Subjective:   Shawn Muhammad is a 44 y.o. male who presents for follow up for low back pain, and dyslipidemia. Pain scale is 6/10. Pain start in his buttock and radiates down to his right mid lateral thigh, and also his left thigh right above the knee. He is currently taking robaxin and  Topamax which provides mild relief. He is followed by [facility]. His next appointment is tomorrow- he states that this appointment is to discuss possible discectomy. Dyslipidemia- Lipid panel is abnormal and shows an increase in Tryglyceride and CHO/HDL ratios and decrease in HDL. He states he lost about 30 pounds over the past two months. He is to follow up next month for repeat labs. Current Outpatient Meds and Allergies     Current Outpatient Prescriptions on File Prior to Visit   Medication Sig Dispense Refill    naproxen sodium (ALEVE) 220 mg tablet Take 220 mg by mouth two (2) times daily (with meals).  topiramate (TOPAMAX) 50 mg tablet Take 50 mg by mouth daily.  methocarbamol (ROBAXIN) 500 mg tablet Take 1 po tid prn spasms 45 Tab 0    topiramate (TOPAMAX) 25 mg tablet Take 1 in the evening for 1 week, then increase to 2 the second week and continue with 3 in the evening 90 Tab 1    methylPREDNISolone (MEDROL DOSEPACK) 4 mg tablet Per dose pack instructions 1 Dose Pack 0    metaxalone (SKELAXIN) 800 mg tablet Take 1 tab by mouth BID-TID PRN  Indications: Muscle Spasm 60 Tab 2    omeprazole (PRILOSEC) 20 mg capsule Take 20 mg by mouth daily.  therapeutic multivitamin-minerals (VITAMINS AND MINERALS) tablet Take 1 Tab by mouth.  fish oil-omega-3 fatty acids (FISH OIL) 340-1,000 mg capsule Take 1,000 mg by mouth two (2) times a day.  diclofenac EC (VOLTAREN) 75 mg EC tablet Take 75 mg by mouth two (2) times a day.  sertraline (ZOLOFT) 25 mg tablet Take 25 mg by mouth daily.  baclofen (LIORESAL) 10 mg tablet Take 1 Tab by mouth three (3) times daily. 90 Tab 0    HYDROcodone-acetaminophen (NORCO) 5-325 mg per tablet Take 1-2 tablets PO every 4-6 hours as needed for pain control. If over the counter ibuprofen or acetaminophen was suggested, then only take the vicodin for pain not well controlled with the over the counter medication. 12 Tab 0     No current facility-administered medications on file prior to visit. These medications have been reviewed and reconciled with the patient during today's visit. Allergies   Allergen Reactions    Codeine Other (comments)     Intolerance           ROS:     CONST:   Denies fatigue,  appetite change. + weight change weight loss of 30 pounds. NEURO:   Denies headaches, vision changes, dizziness, loss of consciousness. + paraesthesia to thigh bilaterally. CV:      Denies chest pain, palpitations, orthopnea, PND  PULM:  Denies SOB, wheezing, cough, hemoptysis  GI:             Denies nausea, vomiting, abdominal pain, greasy stools, blood in stool,     diarrhea, constipation  :       Denies dysuria, hematuria, change in urine  MS:      + pain to lower back L/S area with tingling radiating down to thigh bilaterally. SKIN:        Denies rashes, skin changes  ALLERGY: Denies seasonal allergies, itchy eyes  HEME: Denies easy bleeding/bruising    Objective:     VS:    Visit Vitals    /80 (BP 1 Location: Right arm, BP Patient Position: Sitting)    Pulse 86    Temp 99 °F (37.2 °C) (Oral)    Resp 15    Ht 5' 6\" (1.676 m)    Wt 237 lb 3.2 oz (107.6 kg)    SpO2 93%    BMI 38.29 kg/m2       General:   Well-nourished, well-groomed, pleasant, alert, in no acute distress. Head:  Normocephalic, atraumatic.   Cardiovasc:   Regular rate and rhythm, no murmurs, no rubs, no gallops,   Pulmonary:   Clear breath sounds bilaterally, good air movement, no wheezing, no rales, no rhonchi, normal respiratory effort  MS:  + tenderness to LS spine upon palpation  Neuro:   Alert, conversant, appropriate, following commands, no focal deficits. Pertinent diagnostic procedures include:  No results found for this or any previous visit (from the past 24 hour(s)). Assessment:       1. Lumbar herniated disc        Plan:       Orders Placed This Encounter    DISCONTD: diclofenac (VOLTAREN) 1 % gel     Sig: Apply 1 g to affected area four (4) times daily. Dispense:  1 Each     Refill:  3    lidocaine (LIDODERM) 5 %     Si Patch by TransDERmal route every twenty-four (24) hours. Apply patch to the affected area for 12 hours a day and remove for 12 hours a day. Dispense:  15 Each     Refill:  3    diclofenac (VOLTAREN) 1 % gel     Sig: Apply 1 g to affected area four (4) times daily. Dispense:  1 Each     Refill:  3       Follow up in one month for repeat labs. I have discussed the diagnosis with the patient and the intended plan as seen in the above orders. The patient has received an after-visit summary along with patient information handout. I have discussed medication side effects and warnings with the patient as well. Pt verbalized understanding.     Mone Atkins NP-C  [facility]  [address] [place], [address]  Phone [phone]  Fax [phone]

## 2018-01-18 NOTE — PROGRESS NOTES
Trinidad Chester is a 44 y.o.  male presents today for office visit for follow up. Pt would also like to discuss low back pain. Pt is not fasting. Pt is in Room # 8      1. Have you been to the ER, urgent care clinic since your last visit? Hospitalized since your last visit? No    2. Have you seen or consulted any other health care providers outside of the [address] [place] [address] since your last visit? Include any pap smears or colon screening. No    Upcoming Appts  Neuro- 1/19/18    Health Maintenance reviewed    VORB: No orders of the defined types were placed in this encounter.   Noel Bansal LPN

## 2018-02-14 DIAGNOSIS — E78.1 HYPERTRIGLYCERIDEMIA: Primary | ICD-10-CM

## 2020-07-12 ENCOUNTER — HOSPITAL ENCOUNTER (EMERGENCY)
Age: 40
Discharge: HOME | End: 2020-07-12
Attending: EMERGENCY MEDICINE
Payer: OTHER GOVERNMENT

## 2020-07-12 ENCOUNTER — APPOINTMENT (OUTPATIENT)
Dept: CT IMAGING | Age: 40
End: 2020-07-12
Attending: PHYSICIAN ASSISTANT
Payer: OTHER GOVERNMENT

## 2020-07-12 VITALS
RESPIRATION RATE: 16 BRPM | HEIGHT: 65 IN | HEART RATE: 102 BPM | WEIGHT: 230 LBS | DIASTOLIC BLOOD PRESSURE: 94 MMHG | SYSTOLIC BLOOD PRESSURE: 137 MMHG | OXYGEN SATURATION: 97 % | BODY MASS INDEX: 38.32 KG/M2 | TEMPERATURE: 98 F

## 2020-07-12 DIAGNOSIS — R10.31 ABDOMINAL PAIN, RIGHT LOWER QUADRANT: Primary | ICD-10-CM

## 2020-07-12 LAB
ALBUMIN SERPL-MCNC: 3.9 G/DL (ref 3.4–5)
ALBUMIN/GLOB SERPL: 1 {RATIO} (ref 0.8–1.7)
ALP SERPL-CCNC: 120 U/L (ref 45–117)
ALT SERPL-CCNC: 47 U/L (ref 16–61)
ANION GAP SERPL CALC-SCNC: 7 MMOL/L (ref 3–18)
APPEARANCE UR: CLEAR
AST SERPL-CCNC: 20 U/L (ref 10–38)
BASOPHILS # BLD: 0 K/UL (ref 0–0.1)
BASOPHILS NFR BLD: 0 % (ref 0–2)
BILIRUB SERPL-MCNC: 0.6 MG/DL (ref 0.2–1)
BILIRUB UR QL: NEGATIVE
BUN SERPL-MCNC: 14 MG/DL (ref 7–18)
BUN/CREAT SERPL: 11 (ref 12–20)
CALCIUM SERPL-MCNC: 9.1 MG/DL (ref 8.5–10.1)
CHLORIDE SERPL-SCNC: 104 MMOL/L (ref 100–111)
CO2 SERPL-SCNC: 27 MMOL/L (ref 21–32)
COLOR UR: YELLOW
CREAT SERPL-MCNC: 1.29 MG/DL (ref 0.6–1.3)
DIFFERENTIAL METHOD BLD: ABNORMAL
EOSINOPHIL # BLD: 0.1 K/UL (ref 0–0.4)
EOSINOPHIL NFR BLD: 1 % (ref 0–5)
ERYTHROCYTE [DISTWIDTH] IN BLOOD BY AUTOMATED COUNT: 12.2 % (ref 11.6–14.5)
GLOBULIN SER CALC-MCNC: 3.8 G/DL (ref 2–4)
GLUCOSE SERPL-MCNC: 81 MG/DL (ref 74–99)
GLUCOSE UR STRIP.AUTO-MCNC: NEGATIVE MG/DL
HCT VFR BLD AUTO: 48.6 % (ref 36–48)
HGB BLD-MCNC: 17.4 G/DL (ref 13–16)
HGB UR QL STRIP: NEGATIVE
KETONES UR QL STRIP.AUTO: NEGATIVE MG/DL
LEUKOCYTE ESTERASE UR QL STRIP.AUTO: NEGATIVE
LIPASE SERPL-CCNC: 130 U/L (ref 73–393)
LYMPHOCYTES # BLD: 3.2 K/UL (ref 0.9–3.6)
LYMPHOCYTES NFR BLD: 26 % (ref 21–52)
MCH RBC QN AUTO: 31.2 PG (ref 24–34)
MCHC RBC AUTO-ENTMCNC: 35.8 G/DL (ref 31–37)
MCV RBC AUTO: 87.3 FL (ref 74–97)
MONOCYTES # BLD: 0.9 K/UL (ref 0.05–1.2)
MONOCYTES NFR BLD: 7 % (ref 3–10)
NEUTS SEG # BLD: 8 K/UL (ref 1.8–8)
NEUTS SEG NFR BLD: 66 % (ref 40–73)
NITRITE UR QL STRIP.AUTO: NEGATIVE
PH UR STRIP: 5 [PH] (ref 5–8)
PLATELET # BLD AUTO: 265 K/UL (ref 135–420)
PMV BLD AUTO: 9.9 FL (ref 9.2–11.8)
POTASSIUM SERPL-SCNC: 3.9 MMOL/L (ref 3.5–5.5)
PROT SERPL-MCNC: 7.7 G/DL (ref 6.4–8.2)
PROT UR STRIP-MCNC: NEGATIVE MG/DL
RBC # BLD AUTO: 5.57 M/UL (ref 4.7–5.5)
SODIUM SERPL-SCNC: 138 MMOL/L (ref 136–145)
SP GR UR REFRACTOMETRY: 1.02 (ref 1–1.03)
UROBILINOGEN UR QL STRIP.AUTO: 0.2 EU/DL (ref 0.2–1)
WBC # BLD AUTO: 12.3 K/UL (ref 4.6–13.2)

## 2020-07-12 PROCEDURE — 74011250636 HC RX REV CODE- 250/636: Performed by: PHYSICIAN ASSISTANT

## 2020-07-12 PROCEDURE — 80053 COMPREHEN METABOLIC PANEL: CPT

## 2020-07-12 PROCEDURE — 99283 EMERGENCY DEPT VISIT LOW MDM: CPT

## 2020-07-12 PROCEDURE — 83690 ASSAY OF LIPASE: CPT

## 2020-07-12 PROCEDURE — 96374 THER/PROPH/DIAG INJ IV PUSH: CPT

## 2020-07-12 PROCEDURE — 96375 TX/PRO/DX INJ NEW DRUG ADDON: CPT

## 2020-07-12 PROCEDURE — 85025 COMPLETE CBC W/AUTO DIFF WBC: CPT

## 2020-07-12 PROCEDURE — 74011636320 HC RX REV CODE- 636/320: Performed by: EMERGENCY MEDICINE

## 2020-07-12 PROCEDURE — 74177 CT ABD & PELVIS W/CONTRAST: CPT

## 2020-07-12 PROCEDURE — 81003 URINALYSIS AUTO W/O SCOPE: CPT

## 2020-07-12 RX ORDER — KETOROLAC TROMETHAMINE 15 MG/ML
15 INJECTION, SOLUTION INTRAMUSCULAR; INTRAVENOUS
Status: COMPLETED | OUTPATIENT
Start: 2020-07-12 | End: 2020-07-12

## 2020-07-12 RX ORDER — ONDANSETRON 2 MG/ML
4 INJECTION INTRAMUSCULAR; INTRAVENOUS
Status: COMPLETED | OUTPATIENT
Start: 2020-07-12 | End: 2020-07-12

## 2020-07-12 RX ORDER — KETOROLAC TROMETHAMINE 10 MG/1
10 TABLET, FILM COATED ORAL
Qty: 20 TAB | Refills: 0 | Status: SHIPPED | OUTPATIENT
Start: 2020-07-12 | End: 2020-07-17

## 2020-07-12 RX ADMIN — ONDANSETRON 4 MG: 2 INJECTION INTRAMUSCULAR; INTRAVENOUS at 21:26

## 2020-07-12 RX ADMIN — KETOROLAC TROMETHAMINE 15 MG: 15 INJECTION, SOLUTION INTRAMUSCULAR; INTRAVENOUS at 21:26

## 2020-07-12 RX ADMIN — IOPAMIDOL 100 ML: 612 INJECTION, SOLUTION INTRAVENOUS at 20:28

## 2020-07-12 NOTE — LETTER
[facility] EMERGENCY DEPT 
[address] 
[address] [facility] [id number] [phone] Work/School Note Date: 7/15/2020 To Whom It May concern: 
 
Yumiko Carvajal was seen and treated today in the emergency room by the following provider(s): 
Physician Assistant: Sadie Woodward PA-C. Yumiko Carvajal is excused from work/school on 7/12/2020 through 7/15/2020. He is medically clear to return to work/school on 7/16/2020.   
  
 
Sincerely, 
 
 
 
 
Mikhail Naylor RN

## 2020-07-12 NOTE — ED TRIAGE NOTES
Alert male arrives to ED c/o pain in rt abdomen, began yesterday as low abd cramping, today localized to rt abdomen and radiates across abdomen. Denies n/v currently.

## 2020-07-12 NOTE — LETTER
NOTIFICATION RETURN TO WORK / SCHOOL 
 
7/12/2020 10:16 PM 
 
Mr. Cindy Gant [address] [facility] [address] [facility] [phone] To Whom It May Concern: 
 
Cindy Gant is currently under the care of [facility] EMERGENCY DEPT. He will return to work/school on: 7/13/20 If there are questions or concerns please have the patient contact our office. Sincerely, Hetal Tidwell PA-C

## 2020-07-13 NOTE — ED NOTES
10:21 PM  07/12/20     Discharge instructions given to patient (name) with verbalization of understanding. Patient accompanied by self. Patient discharged with the following prescriptions Toradol, sent to pharmacy. Patient discharged to home (destination).       Royce Chavarria RN

## 2020-07-13 NOTE — DISCHARGE INSTRUCTIONS
Abdominal Pain: Care Instructions  Your Care Instructions     Abdominal pain has many possible causes. Some aren't serious and get better on their own in a few days. Others need more testing and treatment. If your pain continues or gets worse, you need to be rechecked and may need more tests to find out what is wrong. You may need surgery to correct the problem. Don't ignore new symptoms, such as fever, nausea and vomiting, urination problems, pain that gets worse, and dizziness. These may be signs of a more serious problem. Your doctor may have recommended a follow-up visit in the next 8 to 12 hours. If you are not getting better, you may need more tests or treatment. The doctor has checked you carefully, but problems can develop later. If you notice any problems or new symptoms, get medical treatment right away. Follow-up care is a key part of your treatment and safety. Be sure to make and go to all appointments, and call your doctor if you are having problems. It's also a good idea to know your test results and keep a list of the medicines you take. How can you care for yourself at home? · Rest until you feel better. · To prevent dehydration, drink plenty of fluids, enough so that your urine is light yellow or clear like water. Choose water and other caffeine-free clear liquids until you feel better. If you have kidney, heart, or liver disease and have to limit fluids, talk with your doctor before you increase the amount of fluids you drink. · If your stomach is upset, eat mild foods, such as rice, dry toast or crackers, bananas, and applesauce. Try eating several small meals instead of two or three large ones. · Wait until 48 hours after all symptoms have gone away before you have spicy foods, alcohol, and drinks that contain caffeine. · Do not eat foods that are high in fat. · Avoid anti-inflammatory medicines such as aspirin, ibuprofen (Advil, Motrin), and naproxen (Aleve).  These can cause stomach upset. Talk to your doctor if you take daily aspirin for another health problem. When should you call for help? [id number] anytime you think you may need emergency care. For example, call if:  · You passed out (lost consciousness). · You pass maroon or very bloody stools. · You vomit blood or what looks like coffee grounds. · You have new, severe belly pain. Call your doctor now or seek immediate medical care if:  · Your pain gets worse, especially if it becomes focused in one area of your belly. · You have a new or higher fever. · Your stools are black and look like tar, or they have streaks of blood. · You have unexpected vaginal bleeding. · You have symptoms of a urinary tract infection. These may include:  ? Pain when you urinate. ? Urinating more often than usual.  ? Blood in your urine. · You are dizzy or lightheaded, or you feel like you may faint. Watch closely for changes in your health, and be sure to contact your doctor if:  · You are not getting better after 1 day (24 hours). Where can you learn more? Go to http://anoopTeamlyadriana.info/  Enter E683 in the search box to learn more about \"Abdominal Pain: Care Instructions. \"  Current as of: June 26, 2019               Content Version: 12.5  © 3417-7330 [employer]. Care instructions adapted under license by "[employer]" (which disclaims liability or warranty for this information). If you have questions about a medical condition or this instruction, always ask your healthcare professional. [place] [address] any warranty or liability for your use of this information. Patient Education        Abdominal Pain: Care Instructions  Your Care Instructions     Abdominal pain has many possible causes. Some aren't serious and get better on their own in a few days. Others need more testing and treatment.  If your pain continues or gets worse, you need to be rechecked and may need more tests to find out what is wrong. You may need surgery to correct the problem. Don't ignore new symptoms, such as fever, nausea and vomiting, urination problems, pain that gets worse, and dizziness. These may be signs of a more serious problem. Your doctor may have recommended a follow-up visit in the next 8 to 12 hours. If you are not getting better, you may need more tests or treatment. The doctor has checked you carefully, but problems can develop later. If you notice any problems or new symptoms, get medical treatment right away. Follow-up care is a key part of your treatment and safety. Be sure to make and go to all appointments, and call your doctor if you are having problems. It's also a good idea to know your test results and keep a list of the medicines you take. How can you care for yourself at home? · Rest until you feel better. · To prevent dehydration, drink plenty of fluids, enough so that your urine is light yellow or clear like water. Choose water and other caffeine-free clear liquids until you feel better. If you have kidney, heart, or liver disease and have to limit fluids, talk with your doctor before you increase the amount of fluids you drink. · If your stomach is upset, eat mild foods, such as rice, dry toast or crackers, bananas, and applesauce. Try eating several small meals instead of two or three large ones. · Wait until 48 hours after all symptoms have gone away before you have spicy foods, alcohol, and drinks that contain caffeine. · Do not eat foods that are high in fat. · Avoid anti-inflammatory medicines such as aspirin, ibuprofen (Advil, Motrin), and naproxen (Aleve). These can cause stomach upset. Talk to your doctor if you take daily aspirin for another health problem. When should you call for help? [id number] anytime you think you may need emergency care. For example, call if:  · You passed out (lost consciousness). · You pass maroon or very bloody stools.   · You vomit blood or what looks like coffee grounds. · You have new, severe belly pain. Call your doctor now or seek immediate medical care if:  · Your pain gets worse, especially if it becomes focused in one area of your belly. · You have a new or higher fever. · Your stools are black and look like tar, or they have streaks of blood. · You have unexpected vaginal bleeding. · You have symptoms of a urinary tract infection. These may include:  ? Pain when you urinate. ? Urinating more often than usual.  ? Blood in your urine. · You are dizzy or lightheaded, or you feel like you may faint. Watch closely for changes in your health, and be sure to contact your doctor if:  · You are not getting better after 1 day (24 hours). Where can you learn more? Go to http://anoop-adriana.info/  Enter M995 in the search box to learn more about \"Abdominal Pain: Care Instructions. \"  Current as of: June 26, 2019               Content Version: 12.5  © 2670-0587 [employer]. Care instructions adapted under license by [facility] (which disclaims liability or warranty for this information). If you have questions about a medical condition or this instruction, always ask your healthcare professional. [address] any warranty or liability for your use of this information.
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
